# Patient Record
Sex: MALE | Race: WHITE
[De-identification: names, ages, dates, MRNs, and addresses within clinical notes are randomized per-mention and may not be internally consistent; named-entity substitution may affect disease eponyms.]

---

## 2017-02-26 ENCOUNTER — HOSPITAL ENCOUNTER (EMERGENCY)
Dept: HOSPITAL 25 - UCCORT | Age: 67
Discharge: HOME | End: 2017-02-26
Payer: MEDICARE

## 2017-02-26 VITALS — SYSTOLIC BLOOD PRESSURE: 129 MMHG | DIASTOLIC BLOOD PRESSURE: 61 MMHG

## 2017-02-26 DIAGNOSIS — M79.89: ICD-10-CM

## 2017-02-26 DIAGNOSIS — T88.1XXA: Primary | ICD-10-CM

## 2017-02-26 DIAGNOSIS — E07.9: ICD-10-CM
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
## 2017-02-26 DIAGNOSIS — R50.9: ICD-10-CM

## 2017-02-26 DIAGNOSIS — T50.A95A: ICD-10-CM

## 2017-02-26 DIAGNOSIS — Z88.5: ICD-10-CM

## 2017-02-26 DIAGNOSIS — I10: ICD-10-CM

## 2017-02-26 PROCEDURE — G0463 HOSPITAL OUTPT CLINIC VISIT: HCPCS

## 2017-02-26 PROCEDURE — 99212 OFFICE O/P EST SF 10 MIN: CPT

## 2017-02-26 NOTE — UC
Upper Extremity HPI





- HPI Summary


HPI Summary: 





had a polyvalent pneumococcal vaccine 3d ago. Had redness and swelling within 8 

hrs, this has increased over the following days. Mildly painful, feels very hot

, hurts to move arm around. He has had fever to 100.8, but also has had some 

mild cold symptoms. He did get another pneumococcal vaccine last year, but was 

advised to get this one that covered more strains





- History of Current Complaint


Chief Complaint: Kyle


Stated Complaint: LT ARM SWELLING


Time Seen by Provider: 02/26/17 09:41


Hx Obtained From: Patient


Onset/Duration: Gradual Onset, Lasting Days - 3


Severity Initially: Mild


Severity Currently: Mild


Location Of Pain: Is Discrete @ - left upper arm


Character: Dull, Aching, Stiffness


Aggravating Factor(s): Flexion, Extension, Abduction


Alleviating Factor(s): Ice, OTC Meds


Associated Signs And Symptoms: Positive: Swelling, Redness





- Allergies/Home Medications


Allergies/Adverse Reactions: 


 Allergies











Allergy/AdvReac Type Severity Reaction Status Date / Time


 


Morphine Allergy  Rash And Verified 02/26/17 09:54





   Itching  














PMH/Surg Hx/FS Hx/Imm Hx


Endocrine History Of: Reports: Thyroid Disease


Cardiovascular History Of: Reports: Hypertension





- Surgical History


Surgical History: Yes


Surgery Procedure, Year, and Place: L4-5 spinal surgery 1996.  Thyroidectomy 

1995.  R knee ACL repair 2010





- Family History


Known Family History: 


   Negative: Hypertension





- Social History


Occupation: Retired


Lives: With Family


Alcohol Use: Daily


Alcohol Amount: "a drink or 2 every day"


Substance Use Type: None


Smoking Status (MU): Never Smoked Tobacco





Review of Systems


Constitutional: Fever - to 100.8, Chills


Skin: Other - redness


Eyes: Negative


ENT: Negative


Respiratory: Negative


Cardiovascular: Negative


Gastrointestinal: Negative


Genitourinary: Negative


Motor: Negative


Neurovascular: Negative


Musculoskeletal: Negative


Neurological: Negative


Psychological: Negative


All Other Systems Reviewed And Are Negative: Yes





Physical Exam


Triage Information Reviewed: Yes


Appearance: Well-Appearing, No Pain Distress, Well-Nourished


Vital Signs: 


 Initial Vital Signs











Temp  98.0 F   02/26/17 09:56


 


Pulse  63   02/26/17 09:56


 


Resp  16   02/26/17 09:56


 


BP  129/61   02/26/17 09:56


 


Pulse Ox  100   02/26/17 09:56











Vital Signs Reviewed: Yes


Eye Exam: Normal


Neck exam: Normal


Respiratory Exam: Normal


Cardiovascular Exam: Normal


Musculoskeletal Exam: Normal


Neurological Exam: Normal


Psychological Exam: Normal


Skin Exam: Other - left upper arm with large patch of reddened, slightly 

swollen skin from deltoid to elbow. Not circumferential, just on outside of 

arm. Increased warmth. Mild tenderness to palpation. Can't see where the 

injection site was, no drainage





Upper Extremity Course/Dx





- Course


Course Of Treatment: Most consistent with inflammatory reaction to vaccine. 

However, he has had fever and notes increasing redness. He will monitor the area

, start antibiotic only if redness progresses along with fever. Otherwise, 

Motrin and ice





- Differential Dx/Diagnosis


Provider Diagnoses: inflammatory reaction to vaccination





Discharge





- Discharge Plan


Condition: Stable


Disposition: HOME


Prescriptions: 


Sulfamethox/Trimethoprim DS* [Bactrim /160 TAB*] 1 tab PO BID #20 tab


Referrals: 


Jose Velez DO [Primary Care Provider] - 


Additional Instructions: 


Your arm redness and swelling and tenderness is most consistent with a very 

brisk inflammatory reaction to the pneumococcal vaccine. This is a good sign 

that your body is building immunity against this cause of pneumonia. Sometimes 

injection sites can get infected. If you continue to have worsening pain, 

swelling, redness, or have consistent fevers or vomiting, then the possibility 

of infection is greater. In that case, start the antibiotic.


If the redness fades away over the next few days, you don't need the 

antibiotic. Put ice on the area and take ibuprofen 600 mg two or three times a 

day.

## 2018-06-15 ENCOUNTER — HOSPITAL ENCOUNTER (EMERGENCY)
Dept: HOSPITAL 25 - UCCORT | Age: 68
Discharge: HOME | End: 2018-06-15
Payer: MEDICARE

## 2018-06-15 VITALS — SYSTOLIC BLOOD PRESSURE: 123 MMHG | DIASTOLIC BLOOD PRESSURE: 81 MMHG

## 2018-06-15 DIAGNOSIS — Z79.52: ICD-10-CM

## 2018-06-15 DIAGNOSIS — B02.9: Primary | ICD-10-CM

## 2018-06-15 DIAGNOSIS — Z88.5: ICD-10-CM

## 2018-06-15 DIAGNOSIS — R10.31: ICD-10-CM

## 2018-06-15 PROCEDURE — G0463 HOSPITAL OUTPT CLINIC VISIT: HCPCS

## 2018-06-15 PROCEDURE — 99212 OFFICE O/P EST SF 10 MIN: CPT

## 2018-06-15 NOTE — UC
Skin Complaint HPI





- HPI Summary


HPI Summary: 





pt c/o gradual onset of "rash on right upper buttock with pain that radiates to 

RLQ and "some of the same rash" to right suprapubic area.  Pt is on daily 

prednisone for Poly Myalgia Rheumatica. Pt also c/o RLQ discomfort that is 

intermittent. No b[ain with BM, no change in BM, pattern, consistency or 

frequency no fever or chills, nausea, or vomiting





- History of Current Complaint


Chief Complaint: UCGeneralIllness


Time Seen by Provider: 06/15/18 15:21


Stated Complaint: SKIN COMPLAINT


Hx Obtained From: Patient


Onset/Duration: Gradual Onset, Lasting Days, Still Present


Skin Exposure Onset/Duration: Days Ago


Timing: Constant


Onset Severity: Mild


Current Severity: Moderate


Pain Intensity: 3


Location: Discrete


Character: Redness, Raised, Painful


Aggravating Factor(s): Touch


Alleviating Factor(s): Nothing


Associated Signs & Symptoms: Positive: Rash





- Allergy/Home Medications


Allergies/Adverse Reactions: 


 Allergies











Allergy/AdvReac Type Severity Reaction Status Date / Time


 


morphine Allergy  Rash And Verified 06/15/18 15:10





   Itching  











Home Medications: 


 Home Medications





Glucosam/Chond/Collagen/Hyalur [Glucosamine Chondroitin Cap] 1 cap PO DAILY 06/

15/18 [History Confirmed 06/15/18]


Krill Oil 500 mg PO DAILY 06/15/18 [History Confirmed 06/15/18]


L.acidoph,Paracasei, B.lactis [Probiotic] 1 each PO DAILY 06/15/18 [History 

Confirmed 06/15/18]


Multivit-Min/FA/Lycopen/Lutein [Men 50 Plus Multivitamin Tab] 1 each PO DAILY 06

/15/18 [History Confirmed 06/15/18]


Naproxen Sodium [Aleve] 220 mg PO DAILY 06/15/18 [History Confirmed 06/15/18]


Psyllium ADEEL* [Metamucil ADEEL*] 1 teasp PO DAILY 06/15/18 [History Confirmed 06/

15/18]


Tumeric 1 tab PO DAILY 06/15/18 [History Confirmed 06/15/18]


predniSONE TAB* [Deltasone 10 MG TAB*] 10 mg PO BID 06/15/18 [History Confirmed 

06/15/18]











Review of Systems


Constitutional: Fatigue


Skin: Rash


Eyes: Negative


ENT: Negative


Respiratory: Negative


Cardiovascular: Negative


Gastrointestinal: Negative


Genitourinary: Negative


Motor: Negative


Neurovascular: Negative


Musculoskeletal: Negative


Neurological: Negative


Psychological: Negative


Is Patient Immunocompromised?: No


All Other Systems Reviewed And Are Negative: Yes





PMH/Surg Hx/FS Hx/Imm Hx


Previously Healthy: Yes





- Surgical History


Surgical History: Yes


Surgery Procedure, Year, and Place: L4-5 spinal surgery x 2 in 1996.  

Thyroidectomy 1995.  R knee ACL repair 2010





- Family History


Known Family History: 


   Negative: Hypertension





- Social History


Occupation: Retired


Lives: With Family


Alcohol Use: Daily


Alcohol Amount: "a drink or 2 every day"


Substance Use Type: None


Smoking Status (MU): Never Smoked Tobacco


Have You Smoked in the Last Year: No





Physical Exam


Triage Information Reviewed: Yes


Appearance: Well-Appearing


Vital Signs: 


 Initial Vital Signs











Temp  98.2 F   06/15/18 15:21


 


Pulse  57   06/15/18 15:21


 


Resp  16   06/15/18 15:21


 


BP  123/81   06/15/18 15:21


 


Pulse Ox  100   06/15/18 15:21











Vital Signs Reviewed: Yes


Eye Exam: Normal


ENT: Positive: Hearing grossly normal


Dental Exam: Normal


Neck exam: Normal


Respiratory Exam: Normal


Cardiovascular Exam: Normal


Abdominal Exam: Normal


Abdomen Description: Positive: Nontender


Musculoskeletal Exam: Normal


Neurological Exam: Normal


Psychological Exam: Normal


Skin: Positive: rashes - vessiclur rash on right upper buttock,





Course/Dx





- Differential Diagnoses - Skin Complaint


Differential Diagnoses: Cellulitis, Drug Rash, Varicella Zoster





- Diagnoses


Provider Diagnoses: shingles.  abdominal pain





Discharge





- Sign-Out/Discharge


Documenting (check all that apply): Discharge/Admit/Transfer





- Discharge Plan


Condition: Stable


Disposition: HOME


Prescriptions: 


ValACYclovir (*) [Valtrex 1 GM(*)] 1 gm PO Q12H #14 tab


Patient Education Materials:  Shingles (ED)


Referrals: 


Madeleine Contreras PA [Primary Care Provider] - If Needed


Anabel Pedraza MD [Medical Doctor] - 


Additional Instructions: 


Please follow up with your PCP and your Rheumatologist as scheduled. 





- Billing Disposition and Condition


Condition: STABLE


Disposition: Home

## 2018-06-15 NOTE — XMS REPORT
Amadou Torres

 Created on:May 21, 2018



Patient:Amadou Torres

Sex:Male

:1950

External Reference #:[id number]





Demographics







 Address  [address] [place] [address]

 

 Home Phone  [phone]

 

 Mobile Phone  [phone]

 

 Email Address  [email or web address]

 

 Preferred Language  English

 

 Marital Status  Not  Or 

 

 Rastafari Affiliation  Unknown

 

 Race  White

 

 Ethnic Group  Not  Or 









Author







 Organization  [facility]

 

 Address  [address] [address]

 

 Phone  [phone]









Support







 Name  Relationship  Address  Phone

 

 Nina Torres  Wife  Unavailable  [phone]









Care Team Providers







 Name  Role  Phone

 

 Madeleine Contreras PA  Care Team Information   Unavailable









Payers







 Type  Date  Identification Numbers  Payment Provider  Subscriber

 

 Medicare Primary  Effective:  Policy Number:  Medicare  Amadou Torres



   2013  [id number]    









 PayID: [id number]  [address]









 [place] [address]









 Medigap Part B    Policy Number:  Lifetime Benefit  Kady Torres



     [id number]  Solution  









 Group Number: [id number]  [address]

 

 PayID: [place] [address]









 Workers Compensation  Onset: 2005  Policy Number:  [facility]  Amadou Torres



     [id number]  [address] [address]









 Commercial  Effective: 2004  Policy Number:  [facility]  Kady Torres



     [id number]  Use  









 Expires: 2014  Group Number: [id number]  [address]









 PayID: [id number] [id number]







Problems







 Date  Description  Provider  Status

 

 Onset: 2013  Impotence of organic origin  Jose Velez DO  Active

 

 Onset: 2008  Spinal stenosis in cervical region  George Fields MD  
Active

 

 Onset: 2006  Toxic uninodular goiter  George Fields MD  Active

 

 Onset: 10/24/2005  Family history of malignant neoplasm of  George Fields MD
  Active



   gastrointestinal tract    

 

 Onset: 2005  Benign essential hypertension  George Fields MD  Active

 

 Onset: 2005  Strain of supraspinatus muscle AND/OR  George Fields MD  
Active



   tendon    

 

 Onset: 2005  Gastroesophageal reflux disease  George Fields MD  Active

 

 Onset: 2005  Psychosexual dysfunction associated  George Fields MD  
Active



   with inhibited libido    

 

 Onset: 2005  Congestive heart failure  George Fields MD  Active

 

 Onset: 2015  Benign neoplasm of colon  Jose Velez DO  Active

 

 Onset: 2016  Essential hypertension  Jose Velez DO  Active

 

 Onset: 2016  Pure hyperglyceridemia  Jose Velez DO  Active







Family History







 Date  Family Member(s)  Problem(s)  Comments

 

   Father  Accident  spleen injury   age  54/.

 

   Mother  Cancer, Colon  age 60's onset.  age  80's   dementia

 

   Mother  Alzheimer's Disease  

 

   Children  2  

 

   Siblings  None  







Social History







 Type  Date  Description  Comments

 

 Marital Status      

 

 Occupation    Retired  currently  parttime/seasonal temporary



        for  DEC. initial



       retired..working 10-12



       months/year.

 

 Cigarette Use    Former Cigarette Smoker  

 

 Smoking    Patient is a former smoker  







Allergies, Adverse Reactions, Alerts







 Date  Description  Reaction  Status  Severity  Comments

 

 2005  Morphine    active    







Medications







 Medication  Date  Status  Form  Strength  Qnty  SIG  Indications  Ordering



                 Provider

 

 Tizanidine HCL  /  Active  Capsules  2mg  28caps  2 capusles  M54.31  
Thomas,



             by mouth    Rodo,



             at bedtime    DO

 

 Naproxen  /  Active  Tablets  500mg  30tabs  1 tablet  M54.31  Thomas,



             by mouth    Rodo,



             twice    DO



             daily with    



             food    

 

 Tumeric  /  Active            Thomas,



                 Rodo,



                 DO

 

 Probiotic  /  Active  Capsules          Thomas,



 Acidophilus                Rodo,



                 DO

 

 Multivitamins  /  Active  Capsules      1 by mouth    Agustin,



             every day    DO Jose

 

 Saw [place]  /  Active  Capsules  160mg  30caps  every day  N40.1  Agustin
,



                 DO Jose

 

 Demarco Red Fish  /  Active        1 Tablet  E78.1  Agustin,



 Oil            By Mouth    Jose,



             Daily.    DO

 

 Lisinopril  /  Active  Tablets  30mg  90tabs  take one  I10  Noble,



             tablet by    brandon Koehler    DO



             every day    

 

 Viagra  /  Active  Tablets  50mg  18tabs  take one  N52.9  Noble,



             tablet by    brandon Koehler if    DO



             needed    

 

 Aspirin Low  /  Active  Chewtabs  81mg    1 PO qd    Agustin,



 Dose  0000              Jose,



                 

 

                 

 

 Prednisone  05/10/  Hx  Tablets  10mg  21tabs  6 tablets    Noble,



    -          by mouth    Rodo



   /          for 1 day    DO



             then    



             decrease    



             by 1    



             tablet    



             daily    



             until gone    

 

 Omeprazole  10/21/  Hx  Capsules  20mg  90caps  Take One  K21.9  Agustin,



    -    DR      Capsule By    Jose,



   /          Mouth    DO



             Every Day    

 

 Naproxen Sodium  /  Hx  Tablets  550mg  60tabs  take one  M54.2  Agustin,



    -          tablet by    Jose,



   /          mouth    DO



             twice a    



             day with    



             food prn    

 

 Omeprazole  /  Hx  Capsules  20mg  90caps  take one    Agustin



    -    DR      capsule by    Jose,



   /          mouth    DO



             every day    







Immunizations







 CPT Code  Status  Date  Vaccine  Reaction  Lot #

 

 [id number]  Given  2017  Pneumococcal 23 Immunization  Im inj completed, Pt  
[id number]



       Adult Or Immunosuppressed  tolerated well  



       Patient    

 

 85429  Given  2016  Prevnar 13 Pneumococal  Im inj completed, Pt  [id number]



       Conjugate Vaccine  tolerated well  

 

 01938  Given  05/15/2013  Zoster (Zostavax)  Given At Pharmacy  



         RA/222  

 

 08545  Given  2012  Tdap (Adacel) Ages 7 And    



       Above Only    

 

 16757  Given  10/24/2005  Afluria Or Fluvirin Flu Vac    



       Intramuscular    

 

 70418  Given  2004  Tetanus And Diptheria Toxoids    



       For Adult Use-preservative    



       free    







Vital Signs







 Date  Vital  Result  Comment

 

 2018  Body Temperature  98.1 F  









 Weight  186.00 lb  

 

 Heart Rate  68 /min  

 

 BP Systolic  120 mmHg  

 

 BP Diastolic  68 mmHg  

 

 Respiratory Rate  18 /min  

 

 Height  68 inches  5'8"

 

 BMI (Body Mass Index)  28.3 kg/m2  









 2018  Weight  189.00 lb  









 Heart Rate  64 /min  

 

 BP Systolic  130 mmHg  

 

 BP Diastolic  82 mmHg  

 

 Respiratory Rate  18 /min  

 

 Height  68 inches  5'8"

 

 BMI (Body Mass Index)  28.7 kg/m2  









 2018  Weight  184.00 lb  









 Heart Rate  70 /min  

 

 BP Systolic  140 mmHg  

 

 BP Diastolic  80 mmHg  

 

 BP Systolic Recheck  130 mmHg  

 

 BP Diastolic Recheck  76 mmHg  

 

 Respiratory Rate  18 /min  

 

 Height  68 inches  5'8"

 

 BMI (Body Mass Index)  28.0 kg/m2  









 2017  Weight  185.38 lb  









 Heart Rate  68 /min  72 Reg

 

 BP Systolic  126 mmHg  

 

 BP Diastolic  76 mmHg  

 

 BP Systolic Recheck  128 mmHg  

 

 BP Diastolic Recheck  78 mmHg  

 

 Respiratory Rate  18 /min  

 

 Height  68 inches  5'8"

 

 BMI (Body Mass Index)  28.2 kg/m2  









 2017  Weight  183.00 lb  









 Heart Rate  78 /min  72 Reg

 

 BP Systolic  110 mmHg  

 

 BP Diastolic  72 mmHg  

 

 BP Systolic Recheck  120 mmHg  

 

 BP Diastolic Recheck  72 mmHg  

 

 Respiratory Rate  18 /min  

 

 Height  68 inches  5'8"

 

 BMI (Body Mass Index)  27.8 kg/m2  









 2016  Weight  183.00 lb  









 Heart Rate  72 /min  72 Reg

 

 BP Systolic  150 mmHg  

 

 BP Diastolic  82 mmHg  

 

 BP Systolic Recheck  120 mmHg  

 

 BP Diastolic Recheck  80 mmHg  

 

 Respiratory Rate  18 /min  









 2016  Weight  180.56 lb  









 Heart Rate  68 /min  

 

 BP Systolic  110 mmHg  

 

 BP Diastolic  70 mmHg  

 

 Respiratory Rate  18 /min  









 2016  Weight  185.06 lb  









 Heart Rate  78 /min  

 

 BP Systolic  134 mmHg  

 

 BP Diastolic  80 mmHg  

 

 BP Systolic Recheck  120 mmHg  

 

 BP Diastolic Recheck  80 mmHg  

 

 Respiratory Rate  18 /min  

 

 Height  67.25 inches  5'7.25"

 

 BMI (Body Mass Index)  28.8 kg/m2  









 2015  Weight  183.00 lb  









 Heart Rate  66 /min  72 Reg

 

 BP Systolic  134 mmHg  

 

 BP Diastolic  72 mmHg  

 

 BP Systolic Recheck  122 mmHg  

 

 BP Diastolic Recheck  78 mmHg  

 

 Respiratory Rate  18 /min  









 2014  BP Systolic  120 mmHg  









 BP Diastolic  80 mmHg  









 2014  Weight  194.00 lb  









 Heart Rate  78 /min  72 Reg

 

 BP Systolic  130 mmHg  

 

 BP Diastolic  80 mmHg  

 

 Respiratory Rate  18 /min  

 

 Height  68 inches  5'8"









 08/15/2014  BP Systolic  130 mmHg  









 BP Diastolic  80 mmHg  









 08/15/2014  Weight  188.00 lb  









 Heart Rate  66 /min  72 Reg

 

 BP Systolic  132 mmHg  

 

 BP Diastolic  88 mmHg  

 

 Respiratory Rate  18 /min  







Results







 Test  Date  Test  Result  H/L  Range  Note

 

 Laboratory test finding  2018  Esr  &lt;pending&gt;      









 CRP (C-Reactive)  &lt;pending&gt;      

 

 TSH  &lt;pending&gt;      

 

 Vit D 25Oh  &lt;pending&gt;      









 Laboratory test finding  2018  Uric Acid-FCMG  &lt;pending&gt;      

 

 Laboratory test finding  2018  Ebv Early Ag Igg-RL  &lt;pending&gt;      









 Ebv Nuclear Ag Igg-RL  &lt;pending&gt;      

 

 Ebv Vca Igg-RL  &lt;pending&gt;      

 

 Ebv Vca Igm-RL  &lt;pending&gt;      

 

 Anti-Streptolysn O -RL  &lt;pending&gt;      









 CBC with Auto Diff-fcmg  2018  WBC  8.3 K/uL    4.1-11.0  









 RBC  4.87 M/uL    4.60-6.10  

 

 Hemoglobin  14.4 gm/dL    13.5-18.0  

 

 Hematocrit  42.5 %    41.0-53.0  

 

 MCV  87.3 fL    80.0-97.0  

 

 MCH  29.5 pg    27.0-32.0  

 

 MCHC  33.8 g/dL    32.0-36.0  

 

 RDW  13.6 %    11.5-14.5  

 

 PLT Count  268 K/ul    140-400  

 

 MPV  8.0 FL    7.1-10.7  

 

 Neutrophil  65.6 %    35.0-75.0  

 

 Lymphocyte  23.7 %    16.0-52.0  

 

 Monocyte  9.1 %    2.0-10.0  

 

 Eosinophil  1.2 %    0.0-5.0  

 

 Basophil  0.4 %    0.0-4.0  

 

 Abs Neutrophils  5.4 K/uL    2.1-8.0  

 

 Abs Lymphocytes  2.0 K/uL    0.8-5.5  

 

 Abs Monocytes  0.7 K/uL    0.1-1.0  

 

 Abs Eosinophils  0.1 K/uL    0.0-0.5  

 

 Abs Basophils  0.0 K/uL    0.0-0.3  









 Lyme Igm/Igg AB -RL  2018  Lyme Igm/Igg AB @  NEGATIVE    (Neg)  1

 

 Laboratory test finding  2018  Esr  46 mm/hr  High  0-15  









 CRP (C-Reactive)  4.28 mg/dL  High  0.00-0.75  

 

 CCP Antibody Igg  Negative    Negative  

 

 Rheumatoid Factor  &lt;10.0 IU/mL    0.0-10.0  









 Comprehensive Met Panel-FCMG  2018  Sodium  142 mmol/L    135-146  2









 Potassium  4.0 mmol/L    3.5-5.2  

 

 Chloride#  104 mmol/L      3

 

 Carbon Dioxide  29 mmol/L    24-34  

 

 Glucose  104 mg/dL      

 

 BUN  15 mg/dL    6-26  

 

 Creatinine  0.8 mg/dL    0.5-1.4  

 

 Calcium  9.1 mg/dL    8.5-10.2  

 

 Total Protein  6.6 g/dL    6.0-8.0  

 

 Albumin  4.1 g/dL    3.6-4.9  

 

 Globulin  2.5 g/dL    2.0-3.5  

 

 A/G Ratio  1.6 Ratio    1.0-2.2  

 

 Total Bilirubin  0.5 mg/dL    0.1-1.3  

 

 Alkaline Phosphatase  66 U/L      

 

 Alt  12 U/L    3-42  

 

 Ast  13 U/L    8-42  

 

  Maria Alejandra Egfr  &gt;60    &gt;60  4

 

 Non  Maria Alejandra Egfr  &gt;60    &gt;60  5

 

 Anion Gap  9 mmol/L    5-15  6









 Radha Screen With Reflex-FCMG  2018  Radha Screen  NEGATIVE    Negative  









 dsDNA IgG  0.90    Negative  7









 Hemoglobin A1c  2018  Hemoglobin A1c  5.4 %    4.1-5.9  8









 Estimated Average Glucose Calc  108 mg/dL      8









 Laboratory test finding  2018  PSA  2.010 ng/mL    0.000-4.000  8, 9

 

 CBC With Auto Diff  2018  WBC  5.5 K/uL    4.1-11.0  8









 RBC  4.99 M/uL    4.60-6.10  8

 

 Hemoglobin  14.9 gm/dL    13.5-18.0  8

 

 Hematocrit  43.7 %    41.0-53.0  8

 

 MCV  87.6 fL    80.0-97.0  8

 

 MCH  30.0 pg    27.0-32.0  8

 

 MCHC  34.2 g/dL    32.0-36.0  8

 

 RDW  13.7 %    11.5-14.5  8

 

 PLT Count  221 K/ul    140-400  8

 

 MPV  8.2 FL    7.1-10.7  8

 

 Neutrophil  42.9 %    35.0-75.0  8

 

 Lymphocyte  42.7 %    16.0-52.0  8

 

 Monocyte  9.7 %    2.0-10.0  8

 

 Eosinophil  4.2 %    0.0-5.0  8

 

 Basophil  0.5 %    0.0-4.0  8

 

 Abs Neutrophils  2.4 K/uL    2.1-8.0  8

 

 Abs Lymphocytes  2.4 K/uL    0.8-5.5  8

 

 Abs Monocytes  0.5 K/uL    0.1-1.0  8

 

 Abs Eosinophils  0.2 K/uL    0.0-0.5  8

 

 Abs Basophils  0.0 K/uL    0.0-0.3  8









 Laboratory test finding  2018  TSH  2.43 uIU/mL    0.35-4.94  8









 Free T4  0.96 ng/dL    0.70-1.48  8









 Basic ([facility])  2018  Sodium  142 mmol/L    135-146  8, 10









 Potassium  3.9 mmol/L    3.5-5.2  8

 

 Chloride#  105 mmol/L      8, 11

 

 Carbon Dioxide  30 mmol/L    24-34  8

 

 Glucose  109 mg/dL  High    8

 

 BUN  17 mg/dL    6-26  8

 

 Creatinine  0.9 mg/dL    0.5-1.4  8

 

 Calcium  9.1 mg/dL    8.5-10.2  8

 

 Non  Maria Alejandra Egfr  &gt;60    &gt;60  8, 12

 

  Maria Alejandra Egfr  &gt;60    &gt;60  8, 13

 

 Anion Gap  7 mmol/L    5-15  8, 14









 Lipid  2018  Cholesterol  171 mg/dL      8









 Triglycerides  100 mg/dL      8

 

 HDL  51 mg/dL    29-71  8, 15

 

 Chol/ HDL Ratio  3.4 ratio  Low  4.0-6.7  8

 

 VLDL  20 mg/dL    2-29  8

 

 LDL (Calc)  100 mg/dL  High  20-99  8, 16









 Basic ([facility])  2017  Sodium  142 mmol/L    135-146  8, 17









 Potassium  4.0 mmol/L    3.5-5.2  8

 

 Chloride#  107 mmol/L      8, 18

 

 Carbon Dioxide  27 mmol/L    24-34  8

 

 Glucose  114 mg/dL  High    8

 

 BUN  24 mg/dL    6-26  8

 

 Creatinine  0.9 mg/dL    0.5-1.4  8

 

 Calcium  9.0 mg/dL    8.5-10.2  8

 

 Non  Maria Alejandra Egfr  &gt;60    &gt;60  8, 19

 

  Maria Alejandra Egfr  &gt;60    &gt;60  8, 20

 

 Anion Gap  8 mmol/L    7-16  8, 21









 CBC With Auto Diff  2017  WBC  5.8 K/uL    4.1-11.0  8









 RBC  4.99 M/uL    4.60-6.10  8

 

 Hemoglobin  14.5 gm/dL    13.5-18.0  8

 

 Hematocrit  44.3 %    41.0-53.0  8

 

 MCV  88.8 fL    80.0-97.0  8

 

 MCH  29.0 pg    27.0-32.0  8

 

 MCHC  32.7 g/dL    32.0-36.0  8

 

 RDW  14.1 %    11.5-14.5  8

 

 PLT Count  199 K/ul    140-400  8

 

 Neutrophil  42.0 %    35.0-75.0  8

 

 Lymphocyte  44.4 %    16.0-52.0  8

 

 Monocyte  9.5 %    2.0-10.0  8

 

 Eosinophil  3.4 %    0.0-5.0  8

 

 Basophil  0.7 %    0.0-4.0  8

 

 Abs Neutrophils  2.4 K/uL    2.1-8.0  8

 

 Abs Lymphocytes  2.6 K/uL    0.8-5.5  8

 

 Abs Monocytes  0.5 K/uL    0.1-1.0  8

 

 Abs Eosinophils  0.2 K/uL    0.0-0.5  8

 

 Abs Basophils  0.0 K/uL    0.0-0.3  8









 Lipid  2017  Cholesterol  182 mg/dL      8









 Triglycerides  99 mg/dL      8

 

 HDL  53 mg/dL    40-71  8, 22

 

 Chol/ HDL Ratio  3.4 ratio  Low  4.0-6.7  8

 

 VLDL  20 mg/dL    2-29  8

 

 LDL (Calc)  109 mg/dL      8, 23









 Laboratory test finding  2017  TSH  3.21 uIU/mL    0.35-4.94  8









 Free T4  0.77 ng/dL    0.70-1.48  8









 Laboratory test finding  2017  PSA  0.880 ng/mL    0.000-4.000  8, 24

 

 Basic (BMP)  2017  Sodium  137 mmol/L    134-142  8









 Potassium  4.2 mmol/L    3.5-5.2  8

 

 Chloride  105 mmol/L      8

 

 Carbon Dioxide  27 mmol/L    24-34  8

 

 Glucose  100 mg/dL      8

 

 BUN  16 mg/dL    6-26  8

 

 Creatinine  0.8 mg/dL    0.5-1.4  8

 

 Calcium  8.8 mg/dL    8.5-10.2  8

 

 Anion Gap  9 mmol/L    6-14  8

 

 Non  Maria Alejandra Egfr  &gt;60    &gt;60  8, 25

 

  Maria Alejandra Egfr  &gt;60    &gt;60  8, 26









 Laboratory test finding  2017  TSH  2.08 uIU/mL    0.35-4.94  8









 Free T4  0.84 ng/dL    0.70-1.48  8









 CBC With Auto Diff  2017  WBC  5.0 K/uL    4.1-11.0  8









 RBC  4.89 M/uL    4.60-6.10  8

 

 Hemoglobin  14.5 gm/dL    13.5-18.0  8

 

 Hematocrit  42.9 %    41.0-53.0  8

 

 MCV  87.7 fL    80.0-97.0  8

 

 MCH  29.6 pg    27.0-32.0  8

 

 MCHC  33.7 g/dL    32.0-36.0  8

 

 RDW  13.5 %    11.5-14.5  8

 

 PLT Count  208 K/ul    140-400  8

 

 Neutrophil  41.4 %    35.0-75.0  8

 

 Lymphocyte  45.5 %    16.0-52.0  8

 

 Monocyte  8.8 %    2.0-10.0  8

 

 Eosinophil  3.3 %    0.0-5.0  8

 

 Basophil  1.0 %    0.0-4.0  8

 

 Abs Neutrophils  2.1 K/uL    2.1-8.0  8

 

 Abs Lymphocytes  2.3 K/uL    0.8-5.5  8

 

 Abs Monocytes  0.4 K/uL    0.1-1.0  8

 

 Abs Eosinophils  0.2 K/uL    0.0-0.5  8

 

 Abs Basophils  0.0 K/uL    0.0-0.3  8









 Lipid  2017  Cholesterol  161 mg/dL      8









 Triglycerides  56 mg/dL      8

 

 HDL  48 mg/dL    40-71  8, 27

 

 Chol/ HDL Ratio  3.4 ratio  Low  4.0-6.7  8

 

 VLDL  11 mg/dL    2-29  8

 

 LDL (Calc)  102 mg/dL      8, 28









 Laboratory test finding  2016  Thyroid Stim Hormone  2.47 uIU/mL    0.36-
3.74  









 Free T4  0.87 ng/dL    0.76-1.46  









 Lipid Panel  2016  Cholesterol  178 mg/dL    &lt;200  29









 Triglycerides  66 mg/dL    &lt;150  30

 

 HDL Cholesterol  60 mg/dL    &gt;40  31

 

 LDL-Cholesterol  105 mg/dL    &lt; 129  32









 Laboratory test finding  2016  Prostate Specific Antigen  1.50 ng/mL    
  33

 

 CBC With Auto Diff  2016  White Blood Count  4.9 K/uL    3.4-10.5  









 Red Blood Count  5.26 M/uL    4.20-5.80  

 

 Hemoglobin  15.7 gm/dL    12.8-17.0  

 

 Hematocrit  46.4 %    38.0-48.0  

 

 Mean Cell Volume  88.2 fl    80.0-96.0  

 

 Mean Corpuscular HGB  29.8 pg    27.0-33.0  

 

 Mean Corpuscular HGB Conc  33.8 g/dL    31.7-36.0  

 

 Platelet Count  208 K/uL    150-400  

 

 Red Cell Distri Width SD  44.7 fl    36-51  

 

 Red Cell Distri Width %CV  14.1 %    11.6-15.8  

 

 Mean Platelet Volume  10.0 fL    6.6-10.6  

 

 Neut%  44.0 %    33.0-73.0  

 

 Lymph %  44.0 %    17.0-56.0  

 

 Mono %  7.7 %    0.0-10.0  

 

 Eo%  3.5 %    0.0-5.0  

 

 Bas%  0.8 %    0.1-1.0  

 

 Neut#  2.16 K/uL    1.8-7.0  

 

 Lymph #  2.16 K/uL    1.8-7.0  

 

 Mono #  0.38 K/uL    0.0-0.8  

 

 Eos #  0.17 K/uL    0.0-0.5  

 

 Baso #  0.04 K/uL  Low  0.1-0.2  









 BMP (Basic)  2016  Glucose  101 mg/dL      









 BUN  11 mg/dL    7-18  

 

 Creatinine  0.9 mg/dL    0.6-1.3  

 

 Glom Filtration Rate, Estimate  &gt;60 mL/min    &gt;60  

 

 If   &gt;60 mL/min    &gt;60  34

 

 BUN/Creat  12.2 ratio      

 

 Sodium  140 mmol/L    136-145  

 

 Potassium  3.9 mmol/L    3.5-5.1  

 

 Chloride  106 mmol/L      

 

 Carbon Dioxide  30 mmol/L    21-32  

 

 Anion Gap  4 mEq/L  Low  8-16  

 

 Calcium  8.2 mg/dL  Low  8.5-10.1  









 CBC With Auto Diff  2015  WBC  4.9 K/uL    4.1-11.0  35









 RBC  4.94 M/uL    4.60-6.10  35

 

 Hemoglobin  14.8 gm/dL    13.5-18.0  35

 

 Hematocrit  44.5 %    41.0-53.0  35

 

 MCV  90.0 fL    80.0-97.0  35

 

 MCH  29.9 pg    27.0-32.0  35

 

 MCHC  33.3 g/dL    32.0-36.0  35

 

 RDW  14.0 %    11.5-14.5  35

 

 PLT Count  198 K/ul    140-400  35

 

 Neutrophil  46.7 %    35.0-75.0  35

 

 Lymphocyte  40.8 %    16.0-52.0  35

 

 Monocyte  8.4 %    2.0-10.0  35

 

 Eosinophil  3.5 %    0.0-5.0  35

 

 Basophil  0.6 %    0.0-4.0  35

 

 Abs Neutrophils  2.3 K/uL    2.1-8.0  35

 

 Abs Lymphocytes  2.0 K/uL    0.8-5.5  35

 

 Abmon  0.4 K/uL    0.1-1.0  35

 

 Abs Eosinophils  0.2 K/uL    0.0-0.5  35

 

 Abs Basophils  0.0 K/uL    0.0-0.3  35









 Basic (BMP)  2015  Sodium  138 mmol/L    134-142  35









 Potassium  4.1 mmol/L    3.5-5.2  35

 

 Chloride  104 mmol/L      35

 

 Carbon Dioxide  29 mmol/L    24-34  35

 

 Glucose  101 mg/dL      35

 

 BUN  17 mg/dL    6-26  35

 

 Creatinine  0.9 mg/dL    0.5-1.4  35

 

 Calcium  9.1 mg/dL    8.5-10.2  35

 

 Anion Gap  9 mmol/L    6-14  35

 

 Non  Maria Alejandra Egfr  &gt;60    &gt;60  35, 36

 

  Maria Alejandra Egfr  &gt;60    &gt;60  35, 37









 Laboratory test finding  2015  TSH  2.64 uIU/mL    0.35-4.94  35









 Free T4  0.84 ng/dL    0.70-1.48  35









 Lipid  2015  Cholesterol  175 mg/dL      35









 Triglycerides  52 mg/dL      35

 

 HDL  55 mg/dL    40-71  35, 38

 

 Chol/ HDL Ratio  3.2 ratio  Low  4.0-6.7  35

 

 VLDL  10 mg/dL    2-29  35

 

 LDL (Calc)  110 mg/dL      35, 39









 Laboratory test finding  2014  % Baso.  1.1 %    0.0-2.0  









 % Eos.  3.7 %    0.0-4.0  

 

 % Lymph  42 %    20-44  

 

 % Mono  7.6 %    2.0-10.0  

 

 % Yancy  46 %  Low  50-70  

 

 Absolute Baso.  0.1 K/ul    0.0-0.3  

 

 Absolute Eos.  0.2 K/ul    0.0-0.5  

 

 Absolute Lymph.  2.6 K/ul    0.8-4.8  

 

 Absolute Mono.  0.5 K/ul    0.1-1.0  

 

 Absolute Yancy.  2.86 K/ul    2.05-7.63  

 

 BUN  19.0 mg/dL    9.0-21.0  

 

 BUN/Creat Ratio  23.8 ratio  High  12.0-20.0  

 

 Calcium  9.1 mg/dL    8.7-10.5  

 

 Chloride  107.0 mmol/L    98.0-107.0  

 

 Co2  25.0 mmol/L    22.0-30.0  

 

 Creatinine-Serum  0.8 mg/dL    0.8-1.5  

 

 FT4  0.80 ng/dL    0.75-1.54  

 

 Glucose  110.0 mg/dL    75.0-110.0  

 

 HCT  46.9 %    37.0-51.0  

 

 HGB  15.4 Gm/dl    12.0-16.0  

 

 MCH  29.4 pg    26.0-32.0  

 

 MCHC  32.9 g/dL    31.0-36.0  

 

 MCV  89.6 Fl    80.0-97.0  

 

 MPV  6.9 fL    6.0-10.0  

 

 PLT  231 K/ul    140-440  

 

 PSA  0.8 ng/mL    0.0-4.0  

 

 Potasium  4.0 mmol/L    3.6-5.0  

 

 RBC  5.2 M/ul    4.2-6.3  

 

 RDW  12.7 %    11.5-14.5  

 

 Sodium  141.0 mmil/L    137.0-145.0  

 

 TSH  3.45 uIU/ml    0.50-6.00  

 

 WBC  6.2 K/ul    4.1-10.9  

 

 eGFR  103.1      









 Laboratory test finding  2014  % Baso.  1.2 %    0.0-2.0  









 % Eos.  2.5 %    0.0-4.0  

 

 % Lymph  41 %    20-44  

 

 % Mono  9.8 %    2.0-10.0  

 

 % Yancy  46 %  Low  50-70  

 

 Absolute Baso.  0.1 K/ul    0.0-0.3  

 

 Absolute Eos.  0.2 K/ul    0.0-0.5  

 

 Absolute Lymph.  2.5 K/ul    0.8-4.8  

 

 Absolute Mono.  0.6 K/ul    0.1-1.0  

 

 Absolute Yancy.  2.81 K/ul    2.05-7.63  

 

 BUN  14.0 mg/dL    9.0-21.0  

 

 BUN/Creat Ratio  15.6 ratio    12.0-20.0  

 

 Calcium  9.5 mg/dL    8.7-10.5  

 

 Chloride  104.0 mmol/L    98.0-107.0  

 

 Co2  28.0 mmol/L    22.0-30.0  

 

 Creatinine-Serum  0.9 mg/dL    0.8-1.5  

 

 FT4  0.94 ng/dL    0.75-1.54  

 

 Glucose  110.0 mg/dL    75.0-110.0  

 

 HCT  48.3 %    37.0-51.0  

 

 HGB  15.9 Gm/dl    12.0-16.0  

 

 MCH  29.4 pg    26.0-32.0  

 

 MCHC  32.9 g/dL    31.0-36.0  

 

 MCV  89.4 Fl    80.0-97.0  

 

 MPV  6.4 fL    6.0-10.0  

 

 PLT  236 K/ul    140-440  

 

 Potasium  4.2 mmol/L    3.6-5.0  

 

 RBC  5.4 M/ul    4.2-6.3  

 

 RDW  12.6 %    11.5-14.5  

 

 Sodium  139.0 mmil/L    137.0-145.0  

 

 TSH  2.44 uIU/ml    0.50-6.00  

 

 WBC  6.1 K/ul    4.1-10.9  

 

 eGFR  90.0      









 Laboratory test finding  2013  Anion Gap  11 mEq/L    8-16  









 BUN  17 mg/dL    5-23  

 

 BUN/Creat  21.2 ratio      

 

 Bas%  0.6 %    0.1-1.0  

 

 Baso #  0.04 K/uL  Low  0.1-0.2  

 

 Calcium  8.7 mg/dL    8.5-10.1  

 

 Carbon Dioxide  28 mEq/L    18-29  

 

 Chloride  110 mmol/L  High    

 

 Creatinine  0.8 mg/dL    0.5-1.4  

 

 Eo%  2.2 %    0.0-5.0  

 

 Eos #  0.16 K/uL    0.0-0.5  

 

 Free T4  0.94 ng/dL    0.71-1.85  

 

 Glom Filtration Rate, Estimate  &gt;60 mL/min    &gt;60  

 

 Glucose  85 mg/dL      

 

 Hematocrit  42.7 %    38.0-48.0  

 

 Hemoglobin  14.6 gm/dL    12.8-17.0  

 

 If   &gt;60 mL/min    &gt;60  40

 

 Lymph #  2.60 K/uL    1.2-4.0  

 

 Lymph %  36.0 %    17.0-56.0  

 

 Magnesium  1.9 mg/dL    1.7-2.3  

 

 Mean Cell Volume  88.6 fl    80.0-96.0  

 

 Mean Corpuscular HGB  30.3 pg    27.0-33.0  

 

 Mean Corpuscular HGB Conc  34.2 g/dL    31.7-36.0  

 

 Mean Platelet Volume  10.7 fL  High  6.6-10.6  

 

 Mono #  0.61 K/uL  High  0.0-0.6  

 

 Mono %  8.4 %    0.0-10.0  

 

 Neut#  3.81 K/uL    1.8-7.0  

 

 Neut%  52.8 %    33.0-73.0  

 

 Platelet Count  233 K/uL    150-400  

 

 Potassium  3.9 mmol/L    3.5-5.1  

 

 Prostate-Specific Antigen  0.76 ng/mL    0.0-4.0  41

 

 Red Blood Count  4.82 M/uL    4.20-5.80  

 

 Red Cell Distri Width %CV  13.4 %    11.6-15.8  

 

 Red Cell Distri Width SD  42.7 fl    36-51  

 

 Sodium  145 mmol/L    136-145  

 

 Thyroid Stim Hormone  3.10 uIU/mL    0.49-4.67  

 

 White Blood Count  7.2 K/uL    3.4-10.5  









 Testosterone,Free/Weakly  2013  Testosterone,%Free/Weakly  15.0 %    9.0-
46.0  



 Bound    BND        









 Testosterone,Free+Weakly Bound  40.0 ng/dL    40.0-250.0  42

 

 Testosterone,Serum  267 ng/dL  Low  348-1197  









 1  A Negative serologic test for Lyme Disease



   indicates no serologic evidence of infection



   with B burgdorferi at the time this specimen



   was collected.  A repeat specimen should be



   collected in 2 to 4 weeks if clinically



   indicated.



   Unless otherwise specified, testing performed by [facility]



   of [employer]  [address] [place] [address]  [id number]

 

 2  Updated reference range on new analyzer 3-2017

 

 3  Updated reference range on new analyzer 3-2017

 

 4  Concerning GFR Guidelines for  Americans:



   Normal function or mild renal disease, if clinically at risk:  &gt;/=60



   mL/min



   Moderately decreased:  30-59



   Severely decreased:  15-29



   Renal failure:  &lt;15

 

 5  Concerning GFR Guidelines:



   Normal function or mild renal disease, if clinically at risk:  &gt;/=60



   mL/min



   Moderately decreased:  30-59



   Severely decreased:  15-29



   Renal failure:  &lt;15



   



   Glomerular Filtration Rate (GFR) is estimated based on the MDRD



   equation, which assumes a steady state for creatinine as recommended



   by the National Kidney Disease Education Program in conjunction with



   the National Institutes of Health and the National Kidney Foundation.



   



   Clinical conditions in which it may be necessary to measure GFR by



   using clearance methods include extremes of age and body size, severe



   malnutrition or obesity, diseases of skeletal muscle, paraplegia or



   quadriplegia, vegetarian diet, rapidly changing kidney function, and



   calculation of the dose of potentially toxic drugs that are excreted



   by the kidneys.

 

 6  Updated Reference Range 

 

 7  Interpretation:



   



   &lt;0.5 -9 IU/ml Negative



   10-15 IU/ml Equivocal



   &gt;15.0 IU/ml Positive

 

 8  SCHEDULE 1 WEEK PRIOR TO NEXT VISIT

 

 9  Beginning 07 PSA values assayed at [facility] uses



   chemiluminescence methodology manufactured by [employer] for use



   on the DXI analyzer.  Values obtained with different assay methods or



   kits can not be used interchangeably.  Serum PSA measurement is not an



   absolute test for malignancy.  The PSA value should be used in



   conjunction with information available from clinical evaluation and



   other diagnostic procedures.

 

 10  Updated reference range on new analyzer 3-2017

 

 11  Updated reference range on new analyzer 3-2017

 

 12  Concerning GFR Guidelines:



   Normal function or mild renal disease, if clinically at risk:  &gt;/=60



   mL/min



   Moderately decreased:  30-59



   Severely decreased:  15-29



   Renal failure:  &lt;15



   



   Glomerular Filtration Rate (GFR) is estimated based on the MDRD



   equation, which assumes a steady state for creatinine as recommended



   by the National Kidney Disease Education Program in conjunction with



   the National Institutes of Health and the National Kidney Foundation.



   



   Clinical conditions in which it may be necessary to measure GFR by



   using clearance methods include extremes of age and body size, severe



   malnutrition or obesity, diseases of skeletal muscle, paraplegia or



   quadriplegia, vegetarian diet, rapidly changing kidney function, and



   calculation of the dose of potentially toxic drugs that are excreted



   by the kidneys.

 

 13  Concerning GFR Guidelines for  Americans:



   Normal function or mild renal disease, if clinically at risk:  &gt;/=60



   mL/min



   Moderately decreased:  30-59



   Severely decreased:  15-29



   Renal failure:  &lt;15

 

 14  Updated Reference Range 

 

 15  Per NCEP ATP III Guidelines:



   Results lower than 40 mg/dL are suggestive of increased risk for



   coronary artery disease.  Results &gt; or=to 60 mg/dL are considered a



   negative risk factor.

 

 16  Per NCEP ATP III Guidelines:



   Normal Population &lt;130



   Patients with medical conditions: CHD/DM



   Optimal: &lt;100



   Borderline high: 130-159



   High: 160-189



   Very high: &gt;189

 

 17  Updated reference range on new analyzer 3-2017

 

 18  Updated reference range on new analyzer 3-2017

 

 19  Concerning GFR Guidelines:



   Normal function or mild renal disease, if clinically at risk:  &gt;/=60



   mL/min



   Moderately decreased:  30-59



   Severely decreased:  15-29



   Renal failure:  &lt;15



   



   Glomerular Filtration Rate (GFR) is estimated based on the MDRD



   equation, which assumes a steady state for creatinine as recommended



   by the National Kidney Disease Education Program in conjunction with



   the National Institutes of Health and the National Kidney Foundation.



   



   Clinical conditions in which it may be necessary to measure GFR by



   using clearance methods include extremes of age and body size, severe



   malnutrition or obesity, diseases of skeletal muscle, paraplegia or



   quadriplegia, vegetarian diet, rapidly changing kidney function, and



   calculation of the dose of potentially toxic drugs that are excreted



   by the kidneys.

 

 20  Concerning GFR Guidelines for  Americans:



   Normal function or mild renal disease, if clinically at risk:  &gt;/=60



   mL/min



   Moderately decreased:  30-59



   Severely decreased:  15-29



   Renal failure:  &lt;15

 

 21  Updated reference range on new analyzer 3-2017

 

 22  Per NCEP ATP III Guidelines:



   Results lower than 40 mg/dL are suggestive of increased risk for



   coronary artery disease.  Results &gt; or=to 60 mg/dL are considered a



   negative risk factor.

 

 23  Per NCEP ATP III Guidelines:



   Normal Population &lt;130



   Patients with medical conditions: CHD/DM



   Optimal: &lt;100



   Borderline high: 130-159



   High: 160-189



   Very high: &gt;189

 

 24  Beginning 07 PSA values assayed at [facility] uses



   chemiluminescence methodology manufactured by [employer] for use



   on the DXI analyzer.  Values obtained with different assay methods or



   kits can not be used interchangeably.  Serum PSA measurement is not an



   absolute test for malignancy.  The PSA value should be used in



   conjunction with information available from clinical evaluation and



   other diagnostic procedures.

 

 25  Concerning GFR Guidelines:



   Normal function or mild renal disease, if clinically at risk:  &gt;/=60



   mL/min



   Moderately decreased:  30-59



   Severely decreased:  15-29



   Renal failure:  &lt;15



   



   Glomerular Filtration Rate (GFR) is estimated based on the MDRD



   equation, which assumes a steady state for creatinine as recommended



   by the National Kidney Disease Education Program in conjunction with



   the National Institutes of Health and the National Kidney Foundation.



   



   Clinical conditions in which it may be necessary to measure GFR by



   using clearance methods include extremes of age and body size, severe



   malnutrition or obesity, diseases of skeletal muscle, paraplegia or



   quadriplegia, vegetarian diet, rapidly changing kidney function, and



   calculation of the dose of potentially toxic drugs that are excreted



   by the kidneys.

 

 26  Concerning GFR Guidelines for  Americans:



   Normal function or mild renal disease, if clinically at risk:  &gt;/=60



   mL/min



   Moderately decreased:  30-59



   Severely decreased:  15-29



   Renal failure:  &lt;15

 

 27  Per NCEP ATP III Guidelines:



   Results lower than 40 mg/dL are suggestive of increased risk for



   coronary artery disease.  Results &gt; or=to 60 mg/dL are considered a



   negative risk factor.

 

 28  Per NCEP ATP III Guidelines:



   Normal Population &lt;130



   Patients with medical conditions: CHD/DM



   Optimal: &lt;100



   Borderline high: 130-159



   High: 160-189



   Very high: &gt;189

 

 29  Reference Guidelines*:



   Desirable: ........... &lt; 200 mg/dL



   Borderline High: ..... 200-239 mg/dL



   High: ................ &gt;=240 mg/dL



   * The National Cholesterol Education Program (NCEP)

 

 30  Reference Guidelines*:



   Normal: ............. &lt; 150 mg/dL



   Borderline High: .... 150-199 mg/dL



   High: ............... 200-499 mg/dL



   Very High: .......... &gt; 500 mg/dL



   * Source: National Cholesterol Education Program (NCEP)

 

 31  Reference Guidelines*:



   Low HDL: ..... &lt; 40 mg/dL



   Normal:  ..... 40-60 mg/dL



   Desirable: ... &gt; 60 mg/dL



   *The National Cholesterol Education Program(NCEP)

 

 32  Reference Guidelines*:



   Optimal:........... &lt;100 mg/dL



   Near Optimal....... 100-129 mg/dL



   Borderline High.... 130-159 mg/dL



   High............... 160-189 mg/dL



   Very High.......... &gt;=190 mg/dL



   * Source: National Cholesterol Education Program (NCEP)

 

 33  THIS ASSAY IS NOT INTENDED AS A CANCER SCREENING TEST



   The concentration of PSA in a given specimen, determined



   with assays from different manufacturers, can vary due to



   differences in assay methods and reagent specificity. Values



   obtained from different assay methods cannot be used



   interchangeably.

 

 34  Note:



   Persistent reduction for 3 months or more in an eGFR &lt;60



   mL/min/1.73 m2 defines CKD.  Patients with eGFR values &gt;/=60



   mL/min/1.73 m2 may also have CKD if evidence of persistent



   proteinuria is present.



   The original MDRD equation for estimated GFR is not valid



   for patients less than 18 years of age.



   Additional information may be found at www.kdoqi.org.

 

 35  schedule 1 week prior to next visit

 

 36  Concerning GFR Guidelines:



   Normal function or mild renal disease, if clinically at risk:  &gt;/=60



   mL/min



   Moderately decreased:  30-59



   Severely decreased:  15-29



   Renal failure:  &lt;15



   



   Glomerular Filtration Rate (GFR) is estimated based on the MDRD



   equation, which assumes a steady state for creatinine as recommended



   by the National Kidney Disease Education Program in conjunction with



   the National Institutes of Health and the National Kidney Foundation.



   



   Clinical conditions in which it may be necessary to measure GFR by



   using clearance methods include extremes of age and body size, severe



   malnutrition or obesity, diseases of skeletal muscle, paraplegia or



   quadriplegia, vegetarian diet, rapidly changing kidney function, and



   calculation of the dose of potentially toxic drugs that are excreted



   by the kidneys.

 

 37  Concerning GFR Guidelines for  Americans:



   Normal function or mild renal disease, if clinically at risk:  &gt;/=60



   mL/min



   Moderately decreased:  30-59



   Severely decreased:  15-29



   Renal failure:  &lt;15

 

 38  Per NCEP ATP III Guidelines:



   Results lower than 40 mg/dL are suggestive of increased risk for



   coronary artery disease.  Results &gt; or=to 60 mg/dL are considered a



   negative risk factor.

 

 39  Per NCEP ATP III Guidelines:



   Normal Population &lt;130



   Patients with medical conditions: CHD/DM



   Optimal: &lt;100



   Borderline high: 130-159



   High: 160-189



   Very high: &gt;189

 

 40  Note: Persistent reduction for 3 months or more in an eGFR &lt;60 mL/min/
1.73 m2



   defines CKD. Patients with eGFR values &gt;/=60 mL/min/1.73 m2 may also have 
CKD



   if evidence of persistent proteinuria is present. The original MDRD equation 
for



   estimated GFR is not valid for patients less than 18 years of age. Additional



   information may be found at www.kdoqi.org.

 

 41  THIS ASSAY IS NOT INTENDED AS A CANCER SCREENING TEST The concentration of 
PSA in



   a given specimen, determined with assays from different manufacturers, can 
vary



   due to differences in assay methods and reagent specificity. Values obtained 
from



   different assay methods cannot be used interchangeably.

 

 42  Performed at:  - [facility] [address] [id number] 
Lab



   Director: Araceli B Reyes MD, Phone: [phone] Performed at:  - [facility]



   [address] [id number] : Parminder Del Toro MD, Phone: [phone]







Procedures







 Date  CPT Code  Description  Status  Comment

 

 2018    Colonoscopy  Completed  Document: 18 -



         Colonoscopy

 

 2016  [address]  X-Ray Knee Complete  Completed  



     W/Obliques &amp; Tunnel    



     And/Or Standing Views    







Encounters







 Type  Date  Location  Provider  CPT E/M  Dx

 

 Office Visit  2018  8:15a  [facility]  Madeleine Contreras [place]  [id number]  M25.512









 M25.511

 

 M25.551

 

 M25.552

 

 M54.31

 

 Z68.28









 Office Visit  2018 10:00a  [facility]  Madeleine Contreras PA    Z00.00









 I10

 

 N52.9

 

 R73.09

 

 M54.2

 

 Z68.28









 Office Visit  2017  9:00a  [facility]  [place]  [address]  I10









 E05.10

 

 E78.1

 

 N52.9

 

 K21.9

 

 D12.6

 

 H91.93

 

 R73.09









 Office Visit  2017  8:00a  [facility]  [place]z [place]  [address]  I10









 E05.10

 

 E78.1

 

 N52.9

 

 K21.9

 

 D12.6

 

 H91.93

 

 Z12.5

 

 Z00.00

 

 Z23

 

 Z68.27









 Office Visit  2016  8:00a  [facility]  [place] Velez [place]  [address]  I10









 E05.10

 

 E78.1

 

 N52.9

 

 K21.9

 

 D12.6

 

 H91.93









 Office Visit  2016 11:00a  [facility]  [place]  [address]  M54.2









 M25.562









 Office Visit  2016  2:00p  [facility]  Jose Velez DO    Z00.00









 Z12.5

 

 I10

 

 E05.10

 

 E78.1

 

 N52.9

 

 K21.9

 

 D12.6

 

 Z23

 

 Z68.28









 Office Visit  2015  9:45a  [facility]  [place]  [address]  401.1









 242.10

 

 272.1

 

 607.84

 

 530.81

 

 211.3

 

 723.4

 

 724.4







Plan of Care

Future Appointment(s):2019  8:30 am - Madeleine Conrteras PA at [facility]2018  8:30 am - Madeleine Contreras PA at [facility]2018 - Madeleine Contreras 
PAM25.512 Pain in LEFT shoulderComments:Previous lab workup with elevasted ESR, 
CRPWill check labs today and treat accordinglyContinue naproxen - hold 
aspirinIce/heatCall with questions/concernsFollow up:PrnM25.511 Pain in RIGHT 
shoulderComments:Plan as vhtnfN27.551 Pain in RIGHT hipComments:Plan as 
wvxkcK37.552 Pain in LEFT hipComments:Plan as mlvprV17.83 Other fatigueComments:
WIll check labs hfdnzI38.28 Body mass index (BMI) 28.0-28.9, adult

## 2019-01-09 NOTE — HP
PREOPERATIVE HISTORY AND PHYSICAL:

 

DATE OF ADMISSION/SURGERY:  01/16/19

 

DATE OF OFFICE VISIT:  01/03/19

 

ATTENDING SURGEON:  Dr. Roland Correa.* (DICTATED BY NU BRYSON)

 

PROCEDURE:  Left shoulder arthroscopic rotator cuff repair, decompression, 
debridement, and subpectoral biceps tenodesis.

 

CHIEF COMPLAINT:  Left shoulder.

 

HISTORY OF PRESENT ILLNESS:  Amadou is a 68-year-old male who presents to the 
clinic for left shoulder pain due to a rotator cuff tear and biceps tendinitis.
  He has failed conservative measures and therefore agreed to undergo a left 
shoulder arthroscopic rotator cuff repair, decompression, debridement, and 
subpectoral biceps tenodesis with Dr. Correa on 01/16/19.

 

PAST MEDICAL HISTORY:  GERD, polymyalgia rheumatica, hypertension, and bigeminy 
with surgery.

 

PAST SURGICAL HISTORY:  Colonoscopy, thyroidectomy, lumbar laminectomy x2, and 
right knee scope.  The patient denies prior complications with anesthesia 
besides the bigeminy.

 

MEDICATIONS:

1.  Aspirin 81 mg 1 by mouth daily.

2.  Calcium with vitamin D 1 by mouth daily.

3.  Lactobacillus 1 daily.

4.  Multivitamin 1 daily.

5.  Omega-3 one daily.

6.  Saw palmetto 1 twice a day.

7.  Cialis 25 mg 1 by mouth as needed.

8.  Lisinopril 30 mg 1 by mouth every day.

 

ALLERGIES:  MORPHINE.

 

FAMILY HISTORY:  Denies current family history.

 

SOCIAL HISTORY:  He is a former smoker, quit in 1978.  He denies alcohol 
consumption.  He denies illegal drug use.

 

REVIEW OF SYSTEMS:  A 14-point review of systems was reviewed with the patient. 
Positive for current complaint, otherwise negative.  Denies fever, chills, 
history of DVT or PE, history of bleeding disorder, or chest pain or shortness 
of breath.

 

                               PHYSICAL EXAMINATION

 

GENERAL:  A 68-year-old, well-developed, well-nourished male, in no acute 
distress.

 

VITAL SIGNS:  Height 68, weight 186, blood pressure 123/82, respiratory rate 16
, BMI 28.3.

 

HEENT:  Normocephalic, atraumatic.  PERRLA.  Throat clear.

 

NECK:  Supple.

 

PULMONARY:  Lungs are clear to auscultation bilaterally.  No wheezing, rhonchi, 
or rales.

 

CARDIO:  Regular rate and rhythm.  S1, S2.  No murmurs, gallops, or rubs.  No 
edema.

 

ABDOMEN:  Positive bowel sounds.  Soft, nontender.

 

NEURO:  Alert and oriented x3.  Cranial nerves grossly intact.

 

MUSCULOSKELETAL:  Left upper extremity:  Skin is intact.  No warmth or 
erythema. Tenderness over the subacromial space.  Forward flexion and abduction 
to 175. External rotation 55, internal rotation to lumbar spine.  +4/5 strength 
to rotator cuff testing with pain.  Positive impingement, [place], [place]Rodrigo
, [place].  +2 radial pulses.  Sensation intact to light touch distally.

 

 DIAGNOSTIC STUDIES:  MRI revealed high-grade partial thickness tear of the 
inferior surface of the supraspinatus tendon.

 

IMPRESSION:  Left shoulder rotator cuff tear and biceps tendinitis.

 

PLAN:  The patient is scheduled to undergo a left shoulder arthroscopic rotator 
cuff repair, decompression, debridement, and subpectoral biceps tenodesis with 
Dr. Correa on 01/16/19.  He will follow up 10 to 14 days postop for followup 
and suture removal.  Percocet will be used for postop pain management.

 

 ____________________________________ NU BRYSON

 

[id number]/CPS #: [id number]

MTDD

## 2019-01-16 ENCOUNTER — HOSPITAL ENCOUNTER (OUTPATIENT)
Dept: HOSPITAL 25 - OR | Age: 69
Discharge: HOME | End: 2019-01-16
Attending: ORTHOPAEDIC SURGERY
Payer: MEDICARE

## 2019-01-16 VITALS — SYSTOLIC BLOOD PRESSURE: 149 MMHG | DIASTOLIC BLOOD PRESSURE: 90 MMHG

## 2019-01-16 DIAGNOSIS — Z79.82: ICD-10-CM

## 2019-01-16 DIAGNOSIS — Z87.891: ICD-10-CM

## 2019-01-16 DIAGNOSIS — K21.9: ICD-10-CM

## 2019-01-16 DIAGNOSIS — I10: ICD-10-CM

## 2019-01-16 DIAGNOSIS — M35.3: ICD-10-CM
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
## 2019-01-16 DIAGNOSIS — S46.012D: Primary | ICD-10-CM

## 2019-01-16 DIAGNOSIS — M75.22: ICD-10-CM

## 2019-01-16 PROCEDURE — C1713 ANCHOR/SCREW BN/BN,TIS/BN: HCPCS

## 2022-03-11 NOTE — XMS REPORT
Amadou Torres

 Created on:2018



Patient:Amadou Torres

Sex:Male

:1950

External Reference #:[id number]





Demographics







 Address  [address] [place] [address]

 

 Home Phone  [phone]

 

 Mobile Phone  [phone]

 

 Email Address  [email or web address]

 

 Preferred Language  English

 

 Marital Status  Not  Or 

 

 Tenriism Affiliation  Unknown

 

 Race  White

 

 Ethnic Group  Not  Or 









Author







 Organization  [facility]

 

 Address  [address] [address]

 

 Phone  [phone]









Support







 Name  Relationship  Address  Phone

 

 Nina Torres  Wife  Unavailable  [phone]









Care Team Providers







 Name  Role  Phone

 

 Madeleine Contreras PA  Care Team Information   Unavailable









Payers







 Type  Date  Identification Numbers  Payment Provider  Subscriber

 

 Medicare Primary  Effective:  Policy Number:  Medicare  Amadou Torres



   2013  [id number]    









 PayID: [id number]  [address]









 [place] [address]









 Medigap Part B    Policy Number:  Lifetime Benefit  Kady Torres



     [id number]  [employer]  









 Group Number: [id number]  [address]

 

 PayID: [place] [address]









 Workers Compensation  Onset: 2005  Policy Number:  [facility]  Amadou Torres



     [id number]  [address] [address]









 Commercial  Effective: 2004  Policy Number:  [facility]  Richane BINU Torres



     [id number]  Use  









 Expires: 2014  Group Number: [id number]  [address]









 PayID: [id number] [address]







Problems







 Date  Description  Provider  Status

 

 Onset: 2013  Impotence of organic origin  Jose Velez DO  Active

 

 Onset: 2008  Spinal stenosis in cervical region  George Fields MD  
Active

 

 Onset: 2006  Toxic uninodular goiter  George Fields MD  Active

 

 Onset: 10/24/2005  Family history of malignant neoplasm of  George Fields MD
  Active



   gastrointestinal tract    

 

 Onset: 2005  Benign essential hypertension  George Fields MD  Active

 

 Onset: 2005  Strain of supraspinatus muscle AND/OR  George Fields MD  
Active



   tendon    

 

 Onset: 2005  Gastroesophageal reflux disease  George Fields MD  Active

 

 Onset: 2005  Psychosexual dysfunction associated  George Fields MD  
Active



   with inhibited libido    

 

 Onset: 2005  Congestive heart failure  George Fields MD  Active

 

 Onset: 2015  Benign neoplasm of colon  Jose Velez DO  Active

 

 Onset: 2016  Essential hypertension  Jose Velez DO  Active

 

 Onset: 2016  Pure hyperglyceridemia  Jose Velez DO  Active







Family History







 Date  Family Member(s)  Problem(s)  Comments

 

   Father  Accident  spleen injury   age  54/.

 

   Mother  Cancer, Colon  age 60's onset.  age  80's   dementia

 

   Mother  Alzheimer's Disease  

 

   Children  2  

 

   Siblings  None  







Social History







 Type  Date  Description  Comments

 

 Marital Status      

 

 Occupation    Retired  currently  parttime/seasonal temporary



        for  DEC. initial



       retired..working 10-12



       months/year.

 

 Cigarette Use    Former Cigarette Smoker  

 

 Smoking    Patient is a former smoker  







Allergies, Adverse Reactions, Alerts







 Date  Description  Reaction  Status  Severity  Comments

 

 2005  Morphine    active    







Medications







 Medication  Date  Status  Form  Strength  Qnty  SIG  Indications  Ordering



                 Provider

 

 Prednisone  /  Active  Tablets  10mg  60tabs  2 tablets  M35.3  Nosalvador,



             by mouth    Rodo,



             daily    DO

 

 Tizanidine HCL  /  Active  Capsules  2mg  28caps  2 capusles  M54.31  
Thomas,



             by mouth    Rodo,



             at bedtime    DO

 

 Naproxen  /  Active  Tablets  500mg  30tabs  1 tablet  M54.31  Thomas,



             by mouth    Rodo,



             twice    DO



             daily with    



             food    

 

 Tumeric  /  Active            Thomas,



                 Rodo,



                 DO

 

 Probiotic  /  Active  Capsules          William,



 Acidophilus                Rodo,



                 DO

 

 Multivitamins  /  Active  Capsules      1 by mouth    Agustin,



             every day    DO Jose

 

 Saw Drayton  /  Active  Capsules  160mg  30caps  every day  N40.1  Agustin
,



                 DO Jose

 

 Demarco Red Fish  /  Active        1 Tablet  E78.1  Agustin,



 Oil            By Mouth    Jose,



             Daily.    DO

 

 Lisinopril  /  Active  Tablets  30mg  90tabs  take one  I10  Noble,



             tablet by    brandon Koehler    DO



             every day    

 

 Viagra  /  Active  Tablets  50mg  18tabs  take one  N52.9  Noble,



             tablet by    Rodo,



             mouth if    DO



             needed    

 

 Aspirin Low  /  Active  Chewtabs  81mg    1 PO qd    Agustin,



 Dose  0000              Jose,



                 DO

 

                 

 

 Prednisone  /  Hx  Tablets  10mg  6tabs  2 tablets    Noble,



    -          by mouth    Rodo,



   /          for 3 days    DO



                 

 

 Prednisone  /  Hx  Tablets  10mg  54tabs  6 tablets    Thomas,



    -          by mouth x    Rodo,



   /          3 days    DO



             then    



             decrease    



             by 1 pill    



             every 3    



             days until    



             gone    

 

 Prednisone  05/10/  Hx  Tablets  10mg  21tabs  6 tablets    Noble,



    -          by mouth    Rodo,



   /          for 1 day    DO



             then    



             decrease    



             by 1    



             tablet    



             daily    



             until gone    

 

 Omeprazole  10/21/  Hx  Capsules  20mg  90caps  Take One  K21.9  Agustin



    -    DR      Capsule By    Jose,



   /          Mouth    DO



             Every Day    

 

 Naproxen Sodium  /  Hx  Tablets  550mg  60tabs  take one  M54.2  Agustin



    -          tablet by    Jose,



   /          mouth    DO



             twice a    



             day with    



             food prn    

 

 Omeprazole  /  Hx  Capsules  20mg  90caps  take one    Agustin



    -          capsule by    Jose,



   /          mouth    DO



             every day    







Immunizations







 CPT Code  Status  Date  Vaccine  Reaction  Lot #

 

 07933  Given  2017  Pneumococcal 23 Immunization  Im inj completed, Pt  
[id number]



       Adult Or Immunosuppressed  tolerated well  



       Patient    

 

 25358  Given  2016  Prevnar 13 Pneumococal  Im inj completed, Pt  [id number]



       Conjugate Vaccine  tolerated well  

 

 66084  Given  05/15/2013  Zoster (Zostavax)  Given At Pharmacy  



         RA/222  

 

 13666  Given  2012  Tdap (Adacel) Ages 7 And    



       Above Only    

 

 78533  Given  10/24/2005  Afluria Or Fluvirin Flu Vac    



       Intramuscular    

 

 20300  Given  2004  Tetanus And Diptheria Toxoids    



       For Adult Use-preservative    



       free    







Vital Signs







 Date  Vital  Result  Comment

 

 2018  Weight  185.00 lb  









 Heart Rate  68 /min  

 

 BP Systolic  140 mmHg  

 

 BP Diastolic  70 mmHg  

 

 Respiratory Rate  18 /min  

 

 Height  68 inches  5'8"

 

 BMI (Body Mass Index)  28.1 kg/m2  









 2018  Body Temperature  98.1 F  









 Weight  186.00 lb  

 

 Heart Rate  68 /min  

 

 BP Systolic  120 mmHg  

 

 BP Diastolic  68 mmHg  

 

 Respiratory Rate  18 /min  

 

 Height  68 inches  5'8"

 

 BMI (Body Mass Index)  28.3 kg/m2  









 2018  Weight  189.00 lb  









 Heart Rate  64 /min  

 

 BP Systolic  130 mmHg  

 

 BP Diastolic  82 mmHg  

 

 Respiratory Rate  18 /min  

 

 Height  68 inches  5'8"

 

 BMI (Body Mass Index)  28.7 kg/m2  









 2018  Weight  184.00 lb  









 Heart Rate  70 /min  

 

 BP Systolic  140 mmHg  

 

 BP Diastolic  80 mmHg  

 

 BP Systolic Recheck  130 mmHg  

 

 BP Diastolic Recheck  76 mmHg  

 

 Respiratory Rate  18 /min  

 

 Height  68 inches  5'8"

 

 BMI (Body Mass Index)  28.0 kg/m2  









 2017  Weight  185.38 lb  









 Heart Rate  68 /min  72 Reg

 

 BP Systolic  126 mmHg  

 

 BP Diastolic  76 mmHg  

 

 BP Systolic Recheck  128 mmHg  

 

 BP Diastolic Recheck  78 mmHg  

 

 Respiratory Rate  18 /min  

 

 Height  68 inches  5'8"

 

 BMI (Body Mass Index)  28.2 kg/m2  









 2017  Weight  183.00 lb  









 Heart Rate  78 /min  72 Reg

 

 BP Systolic  110 mmHg  

 

 BP Diastolic  72 mmHg  

 

 BP Systolic Recheck  120 mmHg  

 

 BP Diastolic Recheck  72 mmHg  

 

 Respiratory Rate  18 /min  

 

 Height  68 inches  5'8"

 

 BMI (Body Mass Index)  27.8 kg/m2  









 2016  Weight  183.00 lb  









 Heart Rate  72 /min  72 Reg

 

 BP Systolic  150 mmHg  

 

 BP Diastolic  82 mmHg  

 

 BP Systolic Recheck  120 mmHg  

 

 BP Diastolic Recheck  80 mmHg  

 

 Respiratory Rate  18 /min  









 2016  Weight  180.56 lb  









 Heart Rate  68 /min  

 

 BP Systolic  110 mmHg  

 

 BP Diastolic  70 mmHg  

 

 Respiratory Rate  18 /min  









 2016  Weight  185.06 lb  









 Heart Rate  78 /min  

 

 BP Systolic  134 mmHg  

 

 BP Diastolic  80 mmHg  

 

 BP Systolic Recheck  120 mmHg  

 

 BP Diastolic Recheck  80 mmHg  

 

 Respiratory Rate  18 /min  

 

 Height  67.25 inches  5'7.25"

 

 BMI (Body Mass Index)  28.8 kg/m2  









 2015  Weight  183.00 lb  









 Heart Rate  66 /min  72 Reg

 

 BP Systolic  134 mmHg  

 

 BP Diastolic  72 mmHg  

 

 BP Systolic Recheck  122 mmHg  

 

 BP Diastolic Recheck  78 mmHg  

 

 Respiratory Rate  18 /min  









 2014  BP Systolic  120 mmHg  









 BP Diastolic  80 mmHg  









 2014  Weight  194.00 lb  









 Heart Rate  78 /min  72 Reg

 

 BP Systolic  130 mmHg  

 

 BP Diastolic  80 mmHg  

 

 Respiratory Rate  18 /min  

 

 Height  68 inches  5'8"









 08/15/2014  BP Systolic  130 mmHg  









 BP Diastolic  80 mmHg  









 08/15/2014  Weight  188.00 lb  









 Heart Rate  66 /min  72 Reg

 

 BP Systolic  132 mmHg  

 

 BP Diastolic  88 mmHg  

 

 Respiratory Rate  18 /min  







Results







 Test  Date  Test  Result  H/L  Range  Note

 

 Laboratory test finding  2018  Ebv Early Ag Igg  NEGATIVE    (Neg)  1









 Ebv Nuclear Ag Igg  POSITIVE    (Neg)  2

 

 Ebv Vca Igg  POSITIVE    (Neg)  3

 

 Ebv Vca Igm  NEGATIVE    (Neg)  4

 

 Anti-Streptolysn O  &lt;200 IU/mL    (0-200)  5









 Laboratory test finding  2018  Esr  27 mm/hr  High  0-15  









 CRP (C-Reactive)  5.10 mg/dL  High  0.00-0.75  

 

 TSH  1.61 uIU/mL    0.35-4.94  

 

 Vitamin D 25 Hydroxy  30 ng/mL      6









 Comprehensive Met Panel-[facility]  2018  Sodium  141 mmol/L    135-146  7









 Potassium  4.2 mmol/L    3.5-5.2  

 

 Chloride#  104 mmol/L      8

 

 Carbon Dioxide  28 mmol/L    24-34  

 

 Glucose  133 mg/dL  High    

 

 BUN  15 mg/dL    6-26  

 

 Creatinine  0.8 mg/dL    0.5-1.4  

 

 Calcium  8.9 mg/dL    8.5-10.2  

 

 Total Protein  6.3 g/dL    6.0-8.0  

 

 Albumin  3.7 g/dL    3.6-4.9  

 

 Globulin  2.6 g/dL    2.0-3.5  

 

 A/G Ratio  1.4 Ratio    1.0-2.2  

 

 Total Bilirubin  0.4 mg/dL    0.1-1.3  

 

 Alkaline Phosphatase  72 U/L      

 

 Alt  12 U/L    3-42  

 

 Ast  12 U/L    8-42  

 

  Maria Alejandra Egfr  &gt;60    &gt;60  9

 

 Non  Maria Alejandra Egfr  &gt;60    &gt;60  10

 

 Anion Gap  9 mmol/L    5-15  11









 Lyme Igm/Igg AB -RL  2018  Lyme Igm/Igg AB @  NEGATIVE    (Neg)  12

 

 Laboratory test finding  2018  Uric Acid  5.4 mg/dL    2.6-8.4  

 

 Lyme Igm/Igg AB -RL  2018  Lyme Igm/Igg AB @  NEGATIVE    (Neg)  13

 

 [facility]  2018  WBC  8.3 K/uL    4.1-11.0  









 RBC  4.87 M/uL    4.60-6.10  

 

 Hemoglobin  14.4 gm/dL    13.5-18.0  

 

 Hematocrit  42.5 %    41.0-53.0  

 

 MCV  87.3 fL    80.0-97.0  

 

 MCH  29.5 pg    27.0-32.0  

 

 MCHC  33.8 g/dL    32.0-36.0  

 

 RDW  13.6 %    11.5-14.5  

 

 PLT Count  268 K/ul    140-400  

 

 MPV  8.0 FL    7.1-10.7  

 

 Neutrophil  65.6 %    35.0-75.0  

 

 Lymphocyte  23.7 %    16.0-52.0  

 

 Monocyte  9.1 %    2.0-10.0  

 

 Eosinophil  1.2 %    0.0-5.0  

 

 Basophil  0.4 %    0.0-4.0  

 

 Abs Neutrophils  5.4 K/uL    2.1-8.0  

 

 Abs Lymphocytes  2.0 K/uL    0.8-5.5  

 

 Abs Monocytes  0.7 K/uL    0.1-1.0  

 

 Abs Eosinophils  0.1 K/uL    0.0-0.5  

 

 Abs Basophils  0.0 K/uL    0.0-0.3  









 Laboratory test finding  2018  Esr  46 mm/hr  High  0-15  









 CRP (C-Reactive)  4.28 mg/dL  High  0.00-0.75  

 

 CCP Antibody Igg  Negative    Negative  

 

 Rheumatoid Factor  &lt;10.0 IU/mL    0.0-10.0  









 [facility]  2018  Sodium  142 mmol/L    135-146  14









 Potassium  4.0 mmol/L    3.5-5.2  

 

 Chloride#  104 mmol/L      15

 

 Carbon Dioxide  29 mmol/L    24-34  

 

 Glucose  104 mg/dL      

 

 BUN  15 mg/dL    6-26  

 

 Creatinine  0.8 mg/dL    0.5-1.4  

 

 Calcium  9.1 mg/dL    8.5-10.2  

 

 Total Protein  6.6 g/dL    6.0-8.0  

 

 Albumin  4.1 g/dL    3.6-4.9  

 

 Globulin  2.5 g/dL    2.0-3.5  

 

 A/G Ratio  1.6 Ratio    1.0-2.2  

 

 Total Bilirubin  0.5 mg/dL    0.1-1.3  

 

 Alkaline Phosphatase  66 U/L      

 

 Alt  12 U/L    3-42  

 

 Ast  13 U/L    8-42  

 

  Maria Alejandra Egfr  &gt;60    &gt;60  16

 

 Non  Maria Alejandra Egfr  &gt;60    &gt;60  17

 

 Anion Gap  9 mmol/L    5-15  18









 Radha Screen With Reflex-[facility]  2018  Radha Screen  NEGATIVE    Negative  









 dsDNA IgG  0.90    Negative  19









 Hemoglobin A1c  2018  Hemoglobin A1c  5.4 %    4.1-5.9  20









 Estimated Average Glucose Calc  108 mg/dL      20









 Laboratory test finding  2018  PSA  2.010 ng/mL    0.000-4.000  20, 21

 

 CBC With Auto Diff  2018  WBC  5.5 K/uL    4.1-11.0  20









 RBC  4.99 M/uL    4.60-6.10  20

 

 Hemoglobin  14.9 gm/dL    13.5-18.0  20

 

 Hematocrit  43.7 %    41.0-53.0  20

 

 MCV  87.6 fL    80.0-97.0  20

 

 MCH  30.0 pg    27.0-32.0  20

 

 MCHC  34.2 g/dL    32.0-36.0  20

 

 RDW  13.7 %    11.5-14.5  20

 

 PLT Count  221 K/ul    140-400  20

 

 MPV  8.2 FL    7.1-10.7  20

 

 Neutrophil  42.9 %    35.0-75.0  20

 

 Lymphocyte  42.7 %    16.0-52.0  20

 

 Monocyte  9.7 %    2.0-10.0  20

 

 Eosinophil  4.2 %    0.0-5.0  20

 

 Basophil  0.5 %    0.0-4.0  20

 

 Abs Neutrophils  2.4 K/uL    2.1-8.0  20

 

 Abs Lymphocytes  2.4 K/uL    0.8-5.5  20

 

 Abs Monocytes  0.5 K/uL    0.1-1.0  20

 

 Abs Eosinophils  0.2 K/uL    0.0-0.5  20

 

 Abs Basophils  0.0 K/uL    0.0-0.3  20









 Laboratory test finding  2018  TSH  2.43 uIU/mL    0.35-4.94  20









 Free T4  0.96 ng/dL    0.70-1.48  20









 Basic (BMP)  2018  Sodium  142 mmol/L    135-146  20, 22









 Potassium  3.9 mmol/L    3.5-5.2  20

 

 Chloride#  105 mmol/L      20, 23

 

 Carbon Dioxide  30 mmol/L    24-34  20

 

 Glucose  109 mg/dL  High    20

 

 BUN  17 mg/dL    6-26  20

 

 Creatinine  0.9 mg/dL    0.5-1.4  20

 

 Calcium  9.1 mg/dL    8.5-10.2  20

 

 Non  Maria Alejandra Egfr  &gt;60    &gt;60  20, 24

 

  Maria Alejandra Egfr  &gt;60    &gt;60  20, 25

 

 Anion Gap  7 mmol/L    5-15  20, 26









 Lipid  2018  Cholesterol  171 mg/dL      20









 Triglycerides  100 mg/dL      20

 

 HDL  51 mg/dL    29-71  20, 27

 

 Chol/ HDL Ratio  3.4 ratio  Low  4.0-6.7  20

 

 VLDL  20 mg/dL    2-29  20

 

 LDL (Calc)  100 mg/dL  High  20-99  20, 28









 Basic (BMP)  2017  Sodium  142 mmol/L    135-146  20, 29









 Potassium  4.0 mmol/L    3.5-5.2  20

 

 Chloride#  107 mmol/L      20, 30

 

 Carbon Dioxide  27 mmol/L    24-34  20

 

 Glucose  114 mg/dL  High    20

 

 BUN  24 mg/dL    6-26  20

 

 Creatinine  0.9 mg/dL    0.5-1.4  20

 

 Calcium  9.0 mg/dL    8.5-10.2  20

 

 Non  Maria Alejandra Egfr  &gt;60    &gt;60  20, 31

 

  Maria Alejandra Egfr  &gt;60    &gt;60  20, 32

 

 Anion Gap  8 mmol/L    7-16  20, 33









 CBC With Auto Diff  2017  WBC  5.8 K/uL    4.1-11.0  20









 RBC  4.99 M/uL    4.60-6.10  20

 

 Hemoglobin  14.5 gm/dL    13.5-18.0  20

 

 Hematocrit  44.3 %    41.0-53.0  20

 

 MCV  88.8 fL    80.0-97.0  20

 

 MCH  29.0 pg    27.0-32.0  20

 

 MCHC  32.7 g/dL    32.0-36.0  20

 

 RDW  14.1 %    11.5-14.5  20

 

 PLT Count  199 K/ul    140-400  20

 

 Neutrophil  42.0 %    35.0-75.0  20

 

 Lymphocyte  44.4 %    16.0-52.0  20

 

 Monocyte  9.5 %    2.0-10.0  20

 

 Eosinophil  3.4 %    0.0-5.0  20

 

 Basophil  0.7 %    0.0-4.0  20

 

 Abs Neutrophils  2.4 K/uL    2.1-8.0  20

 

 Abs Lymphocytes  2.6 K/uL    0.8-5.5  20

 

 Abs Monocytes  0.5 K/uL    0.1-1.0  20

 

 Abs Eosinophils  0.2 K/uL    0.0-0.5  20

 

 Abs Basophils  0.0 K/uL    0.0-0.3  20









 Laboratory test finding  2017  TSH  3.21 uIU/mL    0.35-4.94  20









 Free T4  0.77 ng/dL    0.70-1.48  20









 Lipid  2017  Cholesterol  182 mg/dL      20









 Triglycerides  99 mg/dL      20

 

 HDL  53 mg/dL    40-71  20, 34

 

 Chol/ HDL Ratio  3.4 ratio  Low  4.0-6.7  20

 

 VLDL  20 mg/dL    2-29  20

 

 LDL (Calc)  109 mg/dL      20, 35









 CBC With Auto Diff  2017  WBC  5.0 K/uL    4.1-11.0  20









 RBC  4.89 M/uL    4.60-6.10  20

 

 Hemoglobin  14.5 gm/dL    13.5-18.0  20

 

 Hematocrit  42.9 %    41.0-53.0  20

 

 MCV  87.7 fL    80.0-97.0  20

 

 MCH  29.6 pg    27.0-32.0  20

 

 MCHC  33.7 g/dL    32.0-36.0  20

 

 RDW  13.5 %    11.5-14.5  20

 

 PLT Count  208 K/ul    140-400  20

 

 Neutrophil  41.4 %    35.0-75.0  20

 

 Lymphocyte  45.5 %    16.0-52.0  20

 

 Monocyte  8.8 %    2.0-10.0  20

 

 Eosinophil  3.3 %    0.0-5.0  20

 

 Basophil  1.0 %    0.0-4.0  20

 

 Abs Neutrophils  2.1 K/uL    2.1-8.0  20

 

 Abs Lymphocytes  2.3 K/uL    0.8-5.5  20

 

 Abs Monocytes  0.4 K/uL    0.1-1.0  20

 

 Abs Eosinophils  0.2 K/uL    0.0-0.5  20

 

 Abs Basophils  0.0 K/uL    0.0-0.3  20









 Basic (BMP)  2017  Sodium  137 mmol/L    134-142  20









 Potassium  4.2 mmol/L    3.5-5.2  20

 

 Chloride  105 mmol/L      20

 

 Carbon Dioxide  27 mmol/L    24-34  20

 

 Glucose  100 mg/dL      20

 

 BUN  16 mg/dL    6-26  20

 

 Creatinine  0.8 mg/dL    0.5-1.4  20

 

 Calcium  8.8 mg/dL    8.5-10.2  20

 

 Anion Gap  9 mmol/L    6-14  20

 

 Non  Maria Alejandra Egfr  &gt;60    &gt;60  20, 36

 

  Maria Alejandra Egfr  &gt;60    &gt;60  20, 37









 Laboratory test finding  2017  TSH  2.08 uIU/mL    0.35-4.94  20









 Free T4  0.84 ng/dL    0.70-1.48  20









 Lipid  2017  Cholesterol  161 mg/dL      20









 Triglycerides  56 mg/dL      20

 

 HDL  48 mg/dL    40-71  20, 38

 

 Chol/ HDL Ratio  3.4 ratio  Low  4.0-6.7  20

 

 VLDL  11 mg/dL    2-29  20

 

 LDL (Calc)  102 mg/dL      20, 39









 Laboratory test finding  2017  PSA  0.880 ng/mL    0.000-4.000  20, 40

 

 BMP (Basic)  2016  Glucose  101 mg/dL      









 BUN  11 mg/dL    7-18  

 

 Creatinine  0.9 mg/dL    0.6-1.3  

 

 Glom Filtration Rate, Estimate  &gt;60 mL/min    &gt;60  

 

 If   &gt;60 mL/min    &gt;60  41

 

 BUN/Creat  12.2 ratio      

 

 Sodium  140 mmol/L    136-145  

 

 Potassium  3.9 mmol/L    3.5-5.1  

 

 Chloride  106 mmol/L      

 

 Carbon Dioxide  30 mmol/L    21-32  

 

 Anion Gap  4 mEq/L  Low  8-16  

 

 Calcium  8.2 mg/dL  Low  8.5-10.1  









 Laboratory test finding  2016  Thyroid Stim Hormone  2.47 uIU/mL    0.36-
3.74  









 Free T4  0.87 ng/dL    0.76-1.46  









 Lipid Panel  2016  Cholesterol  178 mg/dL    &lt;200  42









 Triglycerides  66 mg/dL    &lt;150  43

 

 HDL Cholesterol  60 mg/dL    &gt;40  44

 

 LDL-Cholesterol  105 mg/dL    &lt; 129  45









 Laboratory test finding  2016  Prostate Specific Antigen  1.50 ng/mL    
  46

 

 CBC With Auto Diff  2016  White Blood Count  4.9 K/uL    3.4-10.5  









 Red Blood Count  5.26 M/uL    4.20-5.80  

 

 Hemoglobin  15.7 gm/dL    12.8-17.0  

 

 Hematocrit  46.4 %    38.0-48.0  

 

 Mean Cell Volume  88.2 fl    80.0-96.0  

 

 Mean Corpuscular HGB  29.8 pg    27.0-33.0  

 

 Mean Corpuscular HGB Conc  33.8 g/dL    31.7-36.0  

 

 Platelet Count  208 K/uL    150-400  

 

 Red Cell Distri Width SD  44.7 fl    36-51  

 

 Red Cell Distri Width %CV  14.1 %    11.6-15.8  

 

 Mean Platelet Volume  10.0 fL    6.6-10.6  

 

 Neut%  44.0 %    33.0-73.0  

 

 Lymph %  44.0 %    17.0-56.0  

 

 Mono %  7.7 %    0.0-10.0  

 

 Eo%  3.5 %    0.0-5.0  

 

 Bas%  0.8 %    0.1-1.0  

 

 Neut#  2.16 K/uL    1.8-7.0  

 

 Lymph #  2.16 K/uL    1.8-7.0  

 

 Mono #  0.38 K/uL    0.0-0.8  

 

 Eos #  0.17 K/uL    0.0-0.5  

 

 Baso #  0.04 K/uL  Low  0.1-0.2  









 CBC With Auto Diff  2015  WBC  4.9 K/uL    4.1-11.0  47









 RBC  4.94 M/uL    4.60-6.10  47

 

 Hemoglobin  14.8 gm/dL    13.5-18.0  47

 

 Hematocrit  44.5 %    41.0-53.0  47

 

 MCV  90.0 fL    80.0-97.0  47

 

 MCH  29.9 pg    27.0-32.0  47

 

 MCHC  33.3 g/dL    32.0-36.0  47

 

 RDW  14.0 %    11.5-14.5  47

 

 PLT Count  198 K/ul    140-400  47

 

 Neutrophil  46.7 %    35.0-75.0  47

 

 Lymphocyte  40.8 %    16.0-52.0  47

 

 Monocyte  8.4 %    2.0-10.0  47

 

 Eosinophil  3.5 %    0.0-5.0  47

 

 Basophil  0.6 %    0.0-4.0  47

 

 Abs Neutrophils  2.3 K/uL    2.1-8.0  47

 

 Abs Lymphocytes  2.0 K/uL    0.8-5.5  47

 

 Abmon  0.4 K/uL    0.1-1.0  47

 

 Abs Eosinophils  0.2 K/uL    0.0-0.5  47

 

 Abs Basophils  0.0 K/uL    0.0-0.3  47









 Basic (BMP)  2015  Sodium  138 mmol/L    134-142  47









 Potassium  4.1 mmol/L    3.5-5.2  47

 

 Chloride  104 mmol/L      47

 

 Carbon Dioxide  29 mmol/L    24-34  47

 

 Glucose  101 mg/dL      47

 

 BUN  17 mg/dL    6-26  47

 

 Creatinine  0.9 mg/dL    0.5-1.4  47

 

 Calcium  9.1 mg/dL    8.5-10.2  47

 

 Anion Gap  9 mmol/L    6-14  47

 

 Non  Maria Alejandra Egfr  &gt;60    &gt;60  47, 48

 

  Maria Alejandra Egfr  &gt;60    &gt;60  47, 49









 Laboratory test finding  2015  TSH  2.64 uIU/mL    0.35-4.94  47









 Free T4  0.84 ng/dL    0.70-1.48  47









 Lipid  2015  Cholesterol  175 mg/dL      47









 Triglycerides  52 mg/dL      47

 

 HDL  55 mg/dL    40-71  47, 50

 

 Chol/ HDL Ratio  3.2 ratio  Low  4.0-6.7  47

 

 VLDL  10 mg/dL    2-29  47

 

 LDL (Calc)  110 mg/dL      47, 51









 Laboratory test finding  2014  % Baso.  1.1 %    0.0-2.0  









 % Eos.  3.7 %    0.0-4.0  

 

 % Lymph  42 %    20-44  

 

 % Mono  7.6 %    2.0-10.0  

 

 % Yancy  46 %  Low  50-70  

 

 Absolute Baso.  0.1 K/ul    0.0-0.3  

 

 Absolute Eos.  0.2 K/ul    0.0-0.5  

 

 Absolute Lymph.  2.6 K/ul    0.8-4.8  

 

 Absolute Mono.  0.5 K/ul    0.1-1.0  

 

 Absolute Yancy.  2.86 K/ul    2.05-7.63  

 

 BUN  19.0 mg/dL    9.0-21.0  

 

 BUN/Creat Ratio  23.8 ratio  High  12.0-20.0  

 

 Calcium  9.1 mg/dL    8.7-10.5  

 

 Chloride  107.0 mmol/L    98.0-107.0  

 

 Co2  25.0 mmol/L    22.0-30.0  

 

 Creatinine-Serum  0.8 mg/dL    0.8-1.5  

 

 FT4  0.80 ng/dL    0.75-1.54  

 

 Glucose  110.0 mg/dL    75.0-110.0  

 

 HCT  46.9 %    37.0-51.0  

 

 HGB  15.4 Gm/dl    12.0-16.0  

 

 MCH  29.4 pg    26.0-32.0  

 

 MCHC  32.9 g/dL    31.0-36.0  

 

 MCV  89.6 Fl    80.0-97.0  

 

 MPV  6.9 fL    6.0-10.0  

 

 PLT  231 K/ul    140-440  

 

 PSA  0.8 ng/mL    0.0-4.0  

 

 Potasium  4.0 mmol/L    3.6-5.0  

 

 RBC  5.2 M/ul    4.2-6.3  

 

 RDW  12.7 %    11.5-14.5  

 

 Sodium  141.0 mmil/L    137.0-145.0  

 

 TSH  3.45 uIU/ml    0.50-6.00  

 

 WBC  6.2 K/ul    4.1-10.9  

 

 eGFR  103.1      









 Laboratory test finding  2014  % Baso.  1.2 %    0.0-2.0  









 % Eos.  2.5 %    0.0-4.0  

 

 % Lymph  41 %    20-44  

 

 % Mono  9.8 %    2.0-10.0  

 

 % Yancy  46 %  Low  50-70  

 

 Absolute Baso.  0.1 K/ul    0.0-0.3  

 

 Absolute Eos.  0.2 K/ul    0.0-0.5  

 

 Absolute Lymph.  2.5 K/ul    0.8-4.8  

 

 Absolute Mono.  0.6 K/ul    0.1-1.0  

 

 Absolute Yancy.  2.81 K/ul    2.05-7.63  

 

 BUN  14.0 mg/dL    9.0-21.0  

 

 BUN/Creat Ratio  15.6 ratio    12.0-20.0  

 

 Calcium  9.5 mg/dL    8.7-10.5  

 

 Chloride  104.0 mmol/L    98.0-107.0  

 

 Co2  28.0 mmol/L    22.0-30.0  

 

 Creatinine-Serum  0.9 mg/dL    0.8-1.5  

 

 FT4  0.94 ng/dL    0.75-1.54  

 

 Glucose  110.0 mg/dL    75.0-110.0  

 

 HCT  48.3 %    37.0-51.0  

 

 HGB  15.9 Gm/dl    12.0-16.0  

 

 MCH  29.4 pg    26.0-32.0  

 

 MCHC  32.9 g/dL    31.0-36.0  

 

 MCV  89.4 Fl    80.0-97.0  

 

 MPV  6.4 fL    6.0-10.0  

 

 PLT  236 K/ul    140-440  

 

 Potasium  4.2 mmol/L    3.6-5.0  

 

 RBC  5.4 M/ul    4.2-6.3  

 

 RDW  12.6 %    11.5-14.5  

 

 Sodium  139.0 mmil/L    137.0-145.0  

 

 TSH  2.44 uIU/ml    0.50-6.00  

 

 WBC  6.1 K/ul    4.1-10.9  

 

 eGFR  90.0      









 Laboratory test finding  2013  Anion Gap  11 mEq/L    8-16  









 BUN  17 mg/dL    5-23  

 

 BUN/Creat  21.2 ratio      

 

 Bas%  0.6 %    0.1-1.0  

 

 Baso #  0.04 K/uL  Low  0.1-0.2  

 

 Calcium  8.7 mg/dL    8.5-10.1  

 

 Carbon Dioxide  28 mEq/L    18-29  

 

 Chloride  110 mmol/L  High    

 

 Creatinine  0.8 mg/dL    0.5-1.4  

 

 Eo%  2.2 %    0.0-5.0  

 

 Eos #  0.16 K/uL    0.0-0.5  

 

 Free T4  0.94 ng/dL    0.71-1.85  

 

 Glom Filtration Rate, Estimate  &gt;60 mL/min    &gt;60  

 

 Glucose  85 mg/dL      

 

 Hematocrit  42.7 %    38.0-48.0  

 

 Hemoglobin  14.6 gm/dL    12.8-17.0  

 

 If   &gt;60 mL/min    &gt;60  52

 

 Lymph #  2.60 K/uL    1.2-4.0  

 

 Lymph %  36.0 %    17.0-56.0  

 

 Magnesium  1.9 mg/dL    1.7-2.3  

 

 Mean Cell Volume  88.6 fl    80.0-96.0  

 

 Mean Corpuscular HGB  30.3 pg    27.0-33.0  

 

 Mean Corpuscular HGB Conc  34.2 g/dL    31.7-36.0  

 

 Mean Platelet Volume  10.7 fL  High  6.6-10.6  

 

 Mono #  0.61 K/uL  High  0.0-0.6  

 

 Mono %  8.4 %    0.0-10.0  

 

 Neut#  3.81 K/uL    1.8-7.0  

 

 Neut%  52.8 %    33.0-73.0  

 

 Platelet Count  233 K/uL    150-400  

 

 Potassium  3.9 mmol/L    3.5-5.1  

 

 Prostate-Specific Antigen  0.76 ng/mL    0.0-4.0  53

 

 Red Blood Count  4.82 M/uL    4.20-5.80  

 

 Red Cell Distri Width %CV  13.4 %    11.6-15.8  

 

 Red Cell Distri Width SD  42.7 fl    36-51  

 

 Sodium  145 mmol/L    136-145  

 

 Thyroid Stim Hormone  3.10 uIU/mL    0.49-4.67  

 

 White Blood Count  7.2 K/uL    3.4-10.5  









 Testosterone,Free/Weakly  2013  Testosterone,%Free/Weakly  15.0 %    9.0-
46.0  



 Bound    BND        









 Testosterone,Free+Weakly Bound  40.0 ng/dL    40.0-250.0  54

 

 Testosterone,Serum  267 ng/dL  Low  348-1197  









 1  Unless otherwise specified, testing performed by [facility]  [facility] [place] [address]

 

 2  *** May indicate a current or



   previous infection.



   Unless otherwise specified, testing performed by [facility]  [facility] [place] [address]

 

 3  *** May indicate a current or



   previous infection.



   Unless otherwise specified, testing performed by [facility]  [facility] [place] [address]

 

 4  Unless otherwise specified, testing performed by [facility]  [address] [place]  [address]

 

 5  Unless otherwise specified, testing performed [place]  [address]

 

 6  Clinical Guidelines for recommended serum 25(OH)Vitamin D



   Deficient at less than 20 ng/mL



   Insufficient at 20 to &lt;30 ng/mL



   Sufficient at  ng/mL



   Toxicity at greater than 100 ng/mL

 

 7  Updated reference range on new analyzer 3-2017

 

 8  Updated reference range on new analyzer 3-2017

 

 9  Concerning GFR Guidelines for  Americans:



   Normal function or mild renal disease, if clinically at risk:  &gt;/=60



   mL/min



   Moderately decreased:  30-59



   Severely decreased:  15-29



   Renal failure:  &lt;15

 

 10  Concerning GFR Guidelines:



   Normal function or mild renal disease, if clinically at risk:  &gt;/=60



   mL/min



   Moderately decreased:  30-59



   Severely decreased:  15-29



   Renal failure:  &lt;15



   



   Glomerular Filtration Rate (GFR) is estimated based on the MDRD



   equation, which assumes a steady state for creatinine as recommended



   by the National Kidney Disease Education Program in conjunction with



   the National Institutes of Health and the National Kidney Foundation.



   



   Clinical conditions in which it may be necessary to measure GFR by



   using clearance methods include extremes of age and body size, severe



   malnutrition or obesity, diseases of skeletal muscle, paraplegia or



   quadriplegia, vegetarian diet, rapidly changing kidney function, and



   calculation of the dose of potentially toxic drugs that are excreted



   by the kidneys.

 

 11  Updated Reference Range 

 

 12  A Negative serologic test for Lyme Disease



   indicates no serologic evidence of infection



   with B burgdorferi at the time this specimen



   was collected.  A repeat specimen should be



   collected in 2 to 4 weeks if clinically



   indicated.



   Unless otherwise specified, testing performed [place]  [address]

 

 13  A Negative serologic test for Lyme Disease



   indicates no serologic evidence of infection



   with B burgdorferi at the time this specimen



   was collected.  A repeat specimen should be



   collected in 2 to 4 weeks if clinically



   indicated.



   Unless otherwise specified, testing performed [place]  [address]

 

 14  Updated reference range on new analyzer 3-2017

 

 15  Updated reference range on new analyzer 3-2017

 

 16  Concerning GFR Guidelines for  Americans:



   Normal function or mild renal disease, if clinically at risk:  &gt;/=60



   mL/min



   Moderately decreased:  30-59



   Severely decreased:  15-29



   Renal failure:  &lt;15

 

 17  Concerning GFR Guidelines:



   Normal function or mild renal disease, if clinically at risk:  &gt;/=60



   mL/min



   Moderately decreased:  30-59



   Severely decreased:  15-29



   Renal failure:  &lt;15



   



   Glomerular Filtration Rate (GFR) is estimated based on the MDRD



   equation, which assumes a steady state for creatinine as recommended



   by the National Kidney Disease Education Program in conjunction with



   the National Institutes of Health and the National Kidney Foundation.



   



   Clinical conditions in which it may be necessary to measure GFR by



   using clearance methods include extremes of age and body size, severe



   malnutrition or obesity, diseases of skeletal muscle, paraplegia or



   quadriplegia, vegetarian diet, rapidly changing kidney function, and



   calculation of the dose of potentially toxic drugs that are excreted



   by the kidneys.

 

 18  Updated Reference Range 

 

 19  Interpretation:



   



   &lt;0.5 -9 IU/ml Negative



   10-15 IU/ml Equivocal



   &gt;15.0 IU/ml Positive

 

 20  SCHEDULE 1 WEEK PRIOR TO NEXT VISIT

 

 21  Beginning 07 PSA values assayed at [facility] uses



   chemiluminescence methodology manufactured by [employer] for use



   on the DXI analyzer.  Values obtained with different assay methods or



   kits can not be used interchangeably.  Serum PSA measurement is not an



   absolute test for malignancy.  The PSA value should be used in



   conjunction with information available from clinical evaluation and



   other diagnostic procedures.

 

 22  Updated reference range on new analyzer 3-2017

 

 23  Updated reference range on new analyzer 3-2017

 

 24  Concerning GFR Guidelines:



   Normal function or mild renal disease, if clinically at risk:  &gt;/=60



   mL/min



   Moderately decreased:  30-59



   Severely decreased:  15-29



   Renal failure:  &lt;15



   



   Glomerular Filtration Rate (GFR) is estimated based on the MDRD



   equation, which assumes a steady state for creatinine as recommended



   by the National Kidney Disease Education Program in conjunction with



   the National Institutes of Health and the National Kidney Foundation.



   



   Clinical conditions in which it may be necessary to measure GFR by



   using clearance methods include extremes of age and body size, severe



   malnutrition or obesity, diseases of skeletal muscle, paraplegia or



   quadriplegia, vegetarian diet, rapidly changing kidney function, and



   calculation of the dose of potentially toxic drugs that are excreted



   by the kidneys.

 

 25  Concerning GFR Guidelines for  Americans:



   Normal function or mild renal disease, if clinically at risk:  &gt;/=60



   mL/min



   Moderately decreased:  30-59



   Severely decreased:  15-29



   Renal failure:  &lt;15

 

 26  Updated Reference Range 

 

 27  Per NCEP ATP III Guidelines:



   Results lower than 40 mg/dL are suggestive of increased risk for



   coronary artery disease.  Results &gt; or=to 60 mg/dL are considered a



   negative risk factor.

 

 28  Per NCEP ATP III Guidelines:



   Normal Population &lt;130



   Patients with medical conditions: CHD/DM



   Optimal: &lt;100



   Borderline high: 130-159



   High: 160-189



   Very high: &gt;189

 

 29  Updated reference range on new analyzer 3-2017

 

 30  Updated reference range on new analyzer 3-2017

 

 31  Concerning GFR Guidelines:



   Normal function or mild renal disease, if clinically at risk:  &gt;/=60



   mL/min



   Moderately decreased:  30-59



   Severely decreased:  15-29



   Renal failure:  &lt;15



   



   Glomerular Filtration Rate (GFR) is estimated based on the MDRD



   equation, which assumes a steady state for creatinine as recommended



   by the National Kidney Disease Education Program in conjunction with



   the National Institutes of Health and the National Kidney Foundation.



   



   Clinical conditions in which it may be necessary to measure GFR by



   using clearance methods include extremes of age and body size, severe



   malnutrition or obesity, diseases of skeletal muscle, paraplegia or



   quadriplegia, vegetarian diet, rapidly changing kidney function, and



   calculation of the dose of potentially toxic drugs that are excreted



   by the kidneys.

 

 32  Concerning GFR Guidelines for  Americans:



   Normal function or mild renal disease, if clinically at risk:  &gt;/=60



   mL/min



   Moderately decreased:  30-59



   Severely decreased:  15-29



   Renal failure:  &lt;15

 

 33  Updated reference range on new analyzer 3-2017

 

 34  Per NCEP ATP III Guidelines:



   Results lower than 40 mg/dL are suggestive of increased risk for



   coronary artery disease.  Results &gt; or=to 60 mg/dL are considered a



   negative risk factor.

 

 35  Per NCEP ATP III Guidelines:



   Normal Population &lt;130



   Patients with medical conditions: CHD/DM



   Optimal: &lt;100



   Borderline high: 130-159



   High: 160-189



   Very high: &gt;189

 

 36  Concerning GFR Guidelines:



   Normal function or mild renal disease, if clinically at risk:  &gt;/=60



   mL/min



   Moderately decreased:  30-59



   Severely decreased:  15-29



   Renal failure:  &lt;15



   



   Glomerular Filtration Rate (GFR) is estimated based on the MDRD



   equation, which assumes a steady state for creatinine as recommended



   by the National Kidney Disease Education Program in conjunction with



   the National Institutes of Health and the National Kidney Foundation.



   



   Clinical conditions in which it may be necessary to measure GFR by



   using clearance methods include extremes of age and body size, severe



   malnutrition or obesity, diseases of skeletal muscle, paraplegia or



   quadriplegia, vegetarian diet, rapidly changing kidney function, and



   calculation of the dose of potentially toxic drugs that are excreted



   by the kidneys.

 

 37  Concerning GFR Guidelines for  Americans:



   Normal function or mild renal disease, if clinically at risk:  &gt;/=60



   mL/min



   Moderately decreased:  30-59



   Severely decreased:  15-29



   Renal failure:  &lt;15

 

 38  Per NCEP ATP III Guidelines:



   Results lower than 40 mg/dL are suggestive of increased risk for



   coronary artery disease.  Results &gt; or=to 60 mg/dL are considered a



   negative risk factor.

 

 39  Per NCEP ATP III Guidelines:



   Normal Population &lt;130



   Patients with medical conditions: CHD/DM



   Optimal: &lt;100



   Borderline high: 130-159



   High: 160-189



   Very high: &gt;189

 

 40  Beginning 07 PSA values assayed at [facility] uses



   chemiluminescence methodology manufactured by [employer] for use



   on the DXI analyzer.  Values obtained with different assay methods or



   kits can not be used interchangeably.  Serum PSA measurement is not an



   absolute test for malignancy.  The PSA value should be used in



   conjunction with information available from clinical evaluation and



   other diagnostic procedures.

 

 41  Note:



   Persistent reduction for 3 months or more in an eGFR &lt;60



   mL/min/1.73 m2 defines CKD.  Patients with eGFR values &gt;/=60



   mL/min/1.73 m2 may also have CKD if evidence of persistent



   proteinuria is present.



   The original MDRD equation for estimated GFR is not valid



   for patients less than 18 years of age.



   Additional information may be found at www.kdoqi.org.

 

 42  Reference Guidelines*:



   Desirable: ........... &lt; 200 mg/dL



   Borderline High: ..... 200-239 mg/dL



   High: ................ &gt;=240 mg/dL



   * The National Cholesterol Education Program (NCEP)

 

 43  Reference Guidelines*:



   Normal: ............. &lt; 150 mg/dL



   Borderline High: .... 150-199 mg/dL



   High: ............... 200-499 mg/dL



   Very High: .......... &gt; 500 mg/dL



   * Source: National Cholesterol Education Program (NCEP)

 

 44  Reference Guidelines*:



   Low HDL: ..... &lt; 40 mg/dL



   Normal:  ..... 40-60 mg/dL



   Desirable: ... &gt; 60 mg/dL



   *The National Cholesterol Education Program(NCEP)

 

 45  Reference Guidelines*:



   Optimal:........... &lt;100 mg/dL



   Near Optimal....... 100-129 mg/dL



   Borderline High.... 130-159 mg/dL



   High............... 160-189 mg/dL



   Very High.......... &gt;=190 mg/dL



   * Source: National Cholesterol Education Program (NCEP)

 

 46  THIS ASSAY IS NOT INTENDED AS A CANCER SCREENING TEST



   The concentration of PSA in a given specimen, determined



   with assays from different manufacturers, can vary due to



   differences in assay methods and reagent specificity. Values



   obtained from different assay methods cannot be used



   interchangeably.

 

 47  schedule 1 week prior to next visit

 

 48  Concerning GFR Guidelines:



   Normal function or mild renal disease, if clinically at risk:  &gt;/=60



   mL/min



   Moderately decreased:  30-59



   Severely decreased:  15-29



   Renal failure:  &lt;15



   



   Glomerular Filtration Rate (GFR) is estimated based on the MDRD



   equation, which assumes a steady state for creatinine as recommended



   by the National Kidney Disease Education Program in conjunction with



   the National Institutes of Health and the National Kidney Foundation.



   



   Clinical conditions in which it may be necessary to measure GFR by



   using clearance methods include extremes of age and body size, severe



   malnutrition or obesity, diseases of skeletal muscle, paraplegia or



   quadriplegia, vegetarian diet, rapidly changing kidney function, and



   calculation of the dose of potentially toxic drugs that are excreted



   by the kidneys.

 

 49  Concerning GFR Guidelines for  Americans:



   Normal function or mild renal disease, if clinically at risk:  &gt;/=60



   mL/min



   Moderately decreased:  30-59



   Severely decreased:  15-29



   Renal failure:  &lt;15

 

 50  Per NCEP ATP III Guidelines:



   Results lower than 40 mg/dL are suggestive of increased risk for



   coronary artery disease.  Results &gt; or=to 60 mg/dL are considered a



   negative risk factor.

 

 51  Per NCEP ATP III Guidelines:



   Normal Population &lt;130



   Patients with medical conditions: CHD/DM



   Optimal: &lt;100



   Borderline high: 130-159



   High: 160-189



   Very high: &gt;189

 

 52  Note: Persistent reduction for 3 months or more in an eGFR &lt;60 mL/min/
1.73 m2



   defines CKD. Patients with eGFR values &gt;/=60 mL/min/1.73 m2 may also have 
CKD



   if evidence of persistent proteinuria is present. The original MDRD equation 
for



   estimated GFR is not valid for patients less than 18 years of age. Additional



   information may be found at www.kdoqi.org.

 

 53  THIS ASSAY IS NOT INTENDED AS A CANCER SCREENING TEST The concentration of 
PSA in



   a given specimen, determined with assays from different manufacturers, can 
vary



   due to differences in assay methods and reagent specificity. Values obtained 
from



   different assay methods cannot be used interchangeably.

 

 54  Performed at:  - [facility] [id number] 
Lab



   Director: Araceli B Reyes MD, Phone: [phone] Performed at: [facility] [id number] : Parminder Del oTro MD, Phone: [phone]







Procedures







 Date  CPT Code  Description  Status  Comment

 

 2018    Colonoscopy  Completed  Document: 18 -



         Colonoscopy

 

 2016  [address]  X-Ray Knee Complete  Completed  



     W/Obliques &amp; Tunnel    



     And/Or Standing Views    







Encounters







 Type  Date  Location  Provider  CPT E/M  Dx

 

 Office Visit  2018  8:15a  Madeleine Lopez PA  [address]  M25.512









 M25.511

 

 M25.551

 

 M25.552

 

 R53.83

 

 Z68.28









 Office Visit  2018  8:15a  Madeleine Lopez PA  [address]  M25.512









 M25.511

 

 M25.551

 

 M25.552

 

 M54.31

 

 Z68.28









 Office Visit  2018 10:00a  Madeleine Lopez PA    Z00.00









 I10

 

 N52.9

 

 R73.09

 

 M54.2

 

 Z68.28









 Office Visit  2017  9:00a  [facility]  [place]  [address]  I10









 E05.10

 

 E78.1

 

 N52.9

 

 K21.9

 

 D12.6

 

 H91.93

 

 R73.09









 Office Visit  2017  8:00a  [facility]  [place]  [address]  I10









 E05.10

 

 E78.1

 

 N52.9

 

 K21.9

 

 D12.6

 

 H91.93

 

 Z12.5

 

 Z00.00

 

 Z23

 

 Z68.27









 Office Visit  2016  8:00a  [facility]  [place]  [address]  I10









 E05.10

 

 E78.1

 

 N52.9

 

 K21.9

 

 D12.6

 

 H91.93









 Office Visit  2016 11:00a  [facility]  [place]  [address]  M54.2









 M25.562









 Office Visit  2016  2:00p  [facility]  Jose Velez DO    Z00.00









 Z12.5

 

 I10

 

 E05.10

 

 E78.1

 

 N52.9

 

 K21.9

 

 D12.6

 

 Z23

 

 Z68.28









 Office Visit  2015  9:45a  [facility]  [place]  [address]  401.1









 242.10

 

 272.1

 

 607.84

 

 530.81

 

 211.3

 

 723.4

 

 724.4







Plan of Care

Future Appointment(s):2019  8:30 am - Madeleine Contreras PA at [facility]2018  8:30 am - Madeleine Contreras PA at [facility]2018 - Madeleine Contreras, PAM35.3 
Polymyalgia rheumaticaNew Medication:Prednisone 10 mgNew Labs:EsrCRP (C-Reactive
)Comprehensive Met Panel-FCMGComments:Suspect PMR, but has not noticed 
substantial improvement with prednisoneWill continue at 20 mg prednisone and 
gradually taper downWill refer to rheumatologyCall with questions/
concernsReferral:Anabel Pedraza DR, RheumatologyFollow up:2 months for 
labsZ68.28 Body mass index (BMI) 28.0-28.9, adult PA Needed for    Saxenda 18 MG/3 ML Pen-injectors    Via: [employer]  KEY: [id number]  [place]aquille #[id number]

## 2024-02-20 NOTE — OP
CC:  PCP, NU Henriquez *

 

DATE OF OPERATION:  01/16/19 - \[facility] [place] [facility]\""

 

DATE OF BIRTH:  [birth year]

 

SURGEON:  Roland Correa MD

 

ASSISTANT:  NU Castillo.  An assistant was needed for the entirety of 
the case to help with positioning, retraction, and was utilized throughout all 
portions of the case.

 

ANESTHESIOLOGIST:  Dr. Allen.

 

ANESTHESIA:  General with interscalene block.

 

PRE-OP DIAGNOSIS:  Left shoulder high grade partial thickness tear of the 
rotator cuff and bicipital tendinosis and tendinitis.

 

POST-OP DIAGNOSES:  Full thickness tear of the rotator cuff with bicipital 
tendinosis and tendinitis.

 

OPERATIVE PROCEDURE:

1.  Extensive glenohumeral debridement.

2.  Subacromial decompression with acromioplasty.

3.  Rotator cuff repair in double row fashion.

4.  Subpectoral biceps tenodesis.

 

COMPLICATIONS:  None.

 

ESTIMATED BLOOD LOSS:  Minimal.

 

INDICATIONS:  Amadou Torres is a 68-year-old male, who has had polymyalgia 
rheumatica.  He has a complicated medical history with a suspected partial tear 
of the rotator cuff.  Because of his complicated medical history, the plan 
would be for a REGENETEN patch, almost there is a full thickness tear.  He has 
elected to proceed with surgical treatment after obtaining preoperative medical 
risks optimization; risks include but not limited to bleeding, infection, 
damage to nerves, vessels, surrounding structures, wound nonhealing, persistent 
pain, need for surgery, scarring, stiffness, incomplete relief of symptoms, 
risks of anesthesia.

 

DESCRIPTION OF PROCEDURE:  The patient was greeted in the preoperative area by 
the attending surgeon.  Correct extremity was marked, consent was confirmed.  
The patient underwent interscalene nerve block, after which he was brought back 
to the operating suite and he was placed in supine position on the operating 
table.  He then underwent general anesthesia with endotracheal intubation.  He 
was then placed in the left lateral decubitus position with all bony 
prominences padded.  He was secured with the peg board and an axillary roll was 
placed.  The left arm was draped unsterile with 10 pounds traction.  The left 
shoulder was then prepped and draped in usual sterile fashion beginning with 
chlorhexidine soap, scrub and alcohol wipe and a final prep with ChloraPrep.

 

After appropriate surgical pause indicating side, site, procedure, and 
administration of antibiotics, a standard postero-lateral portal was made 
sharply with #11 blade.  The scope was introduced into the joint.  The joint 
was examined. The glenohumeral joint had minimal arthritic changes.  The 
superior labrum was obviously torn.  The biceps was subluxed anteriorly.  The 
anterior portal was made in an outside-in fashion.  The biceps was then 
tenotomized using the arthroscopic scissors.  Inferior recess was intact.  
There was abundant synovitis inferiorly. The undersurface of the rotator cuff 
had what appeared to be high grade partial thickness tearing except for one 
spot that looked like a full thickness tear.  The biceps was then tenotomized.  
The inferior recess was intact.  The anterior, posterior, and superior labrum 
were debrided back.  Attention was then directed to the subacromial space.

 

With the scope positioned in the subacromial space, the shaver was used to 
debride back the abundant bursa that was present.  The rotator cuff appeared to 
have one area where there was a full thickness tear.  Decision was made to 
complete the tear because there was what appeared to look like a full thickness 
tear inferiorly and I do not think a REGENETEN patch would have done enough in 
his case.  The tear was completed and exposed.  Attention was directed to the 
subacromial space.

 

The undersurface of the acromion was skeletonized, a 4-0 oval alia was used to 
do it and an electrocautery device.  Bone quality was quite good.  Attention 
was directed to the rotator cuff.  The tear was completed and the 
electrocautery device was used to skeletonize the greater tuberosity.  A 4-0 
oval alia was then used to do an acromioplasty through separate stab incisions.
  Two 4.75 Healicoils were then placed with excellent purchase.  These were 
then tied down using arthroscopic knot- tying technique and two Multi-Fixes 
were then placed for lateral row fixation, this helped to restore the rotator 
cuff appropriately ______ the footprint as well as compress it.  The final 
images were obtained.  The wounds were copiously irrigated with sterile saline.

 

Attention was directed to the biceps.

 

The bed was airplaned to the left side.  The anterior aspect of the shoulder 
was prepped using ChloraPrep.  A 15 blade was used to make the incision in line 
with the biceps.  The soft tissues were carefully dissected to expose the pec 
tendon. Remainder of the dissection was done bluntly.  The biceps were brought 
through the wound.  The groove was then prepared in the usual fashion using 
electrocautery device, red ball rasp and osteotome to allow for bony bleeding 
bed.  The Q-Fix was then deployed with excellent purchase.  The sutures were 
then passed through the biceps in a Mikhail-Master type configuration.  The excess 
stump was excised.  The biceps was shelved back into the wound and then tied 
down using knot tying.  The wounds were then copiously irrigated.  The portals 
were closed with 3-0 nylon, the anterior wound was closed with 3-0 Vicryl 
interrupted in a running fashion. Sterile dressings were applied.  A Cryo/Cuff 
and UltraSling were applied.  He was awoken from anesthesia and transferred to 
PACU in stable condition.

 

POSTOPERATIVE PLAN:  He will be nonweightbearing.  He will be discharged on 
pain medication and antibiotics.  DVT prophylaxis was considered but deferred 
due to no previous personal or family history.  I will see the patient back in 
10 to 14 days.

 

 [id number]/CPS #: [id number]

ANDRZEJ
POST-OP DIAGNOSIS:  Atypical ductal hyperplasia of right breast 20-Feb-2024 17:11:56  Ila Luna